# Patient Record
Sex: FEMALE | Race: BLACK OR AFRICAN AMERICAN | Employment: OTHER | URBAN - METROPOLITAN AREA
[De-identification: names, ages, dates, MRNs, and addresses within clinical notes are randomized per-mention and may not be internally consistent; named-entity substitution may affect disease eponyms.]

---

## 2019-05-14 ENCOUNTER — HOSPITAL ENCOUNTER (EMERGENCY)
Facility: HOSPITAL | Age: 69
Discharge: HOME | End: 2019-05-15
Attending: EMERGENCY MEDICINE
Payer: MEDICARE

## 2019-05-14 DIAGNOSIS — I10 UNCONTROLLED HYPERTENSION: Primary | ICD-10-CM

## 2019-05-14 PROCEDURE — 99283 EMERGENCY DEPT VISIT LOW MDM: CPT

## 2019-05-14 PROCEDURE — 93010 ELECTROCARDIOGRAM REPORT: CPT | Performed by: EMERGENCY MEDICINE

## 2019-05-14 PROCEDURE — 93005 ELECTROCARDIOGRAM TRACING: CPT

## 2019-05-14 RX ORDER — HYDRALAZINE HYDROCHLORIDE 10 MG/1
10 TABLET, FILM COATED ORAL ONCE
Status: COMPLETED | OUTPATIENT
Start: 2019-05-14 | End: 2019-05-14

## 2019-05-15 VITALS
DIASTOLIC BLOOD PRESSURE: 81 MMHG | HEIGHT: 64 IN | HEART RATE: 66 BPM | RESPIRATION RATE: 20 BRPM | TEMPERATURE: 98 F | SYSTOLIC BLOOD PRESSURE: 150 MMHG | OXYGEN SATURATION: 95 % | BODY MASS INDEX: 26.39 KG/M2 | WEIGHT: 154.56 LBS

## 2019-05-15 NOTE — ED NOTES
Pt reports she had a headache today and checked her BP several times at home. High was 178/105. Low was 142/95. HR 77-91. Current /80 with 7/10 headache. Pt states she took 2 tylenol of unknown strength at 2030 and has some relief from pain.

## 2019-05-15 NOTE — ED PROVIDER NOTES
Patient Seen in: [facility] AND [facility] Emergency Department    History   Patient presents with:  Hypertension (cardiovascular)      HPI    Patient presents to the ED concerned about elevated blood pressure from her baseline today.   She states she took her pr 1.00        Years: 30.00        Pack years: 27        Quit date: 2005        Years since quittin.3      Smokeless tobacco: Never Used    Substance and Sexual Activity      Alcohol use: No        Alcohol/week: 0.0 oz      Drug use: No      ROS  Per hydrALAzine HCl (APRESOLINE) tab 10 mg (10 mg Oral Given 5/14/19 2323)         MDM      05/14/19  2315 05/14/19  2330 05/14/19  2356 05/15/19  0000   BP: 142/78 140/82  150/81   Pulse: 68 71  66   Resp: 20 20  20   Temp:       TempSrc:       SpO2: 95% 96

## 2019-05-15 NOTE — ED INITIAL ASSESSMENT (HPI)
Pt sts having a headache today, so she took her BP which was 164/103. Pt has hx of HTN, so she checked her BP almost hourly. Pt has a hx of stroke, so she was concerned. Pt takes her BP meds. Pt also c/p weakness. Denies CP. + DIZZINESS.

## 2023-06-01 ENCOUNTER — OFFICE VISIT (OUTPATIENT)
Dept: AUDIOLOGY | Facility: CLINIC | Age: 73
End: 2023-06-01

## 2023-06-01 ENCOUNTER — OFFICE VISIT (OUTPATIENT)
Dept: OTOLARYNGOLOGY | Facility: CLINIC | Age: 73
End: 2023-06-01

## 2023-06-01 VITALS — HEIGHT: 64 IN | WEIGHT: 173 LBS | BODY MASS INDEX: 29.53 KG/M2

## 2023-06-01 DIAGNOSIS — R42 DIZZINESS: Primary | ICD-10-CM

## 2023-06-01 DIAGNOSIS — J34.3 NASAL TURBINATE HYPERTROPHY: ICD-10-CM

## 2023-06-01 PROCEDURE — 1159F MED LIST DOCD IN RCRD: CPT | Performed by: SPECIALIST

## 2023-06-01 PROCEDURE — 1160F RVW MEDS BY RX/DR IN RCRD: CPT | Performed by: SPECIALIST

## 2023-06-01 PROCEDURE — 92557 COMPREHENSIVE HEARING TEST: CPT | Performed by: AUDIOLOGIST

## 2023-06-01 PROCEDURE — 99203 OFFICE O/P NEW LOW 30 MIN: CPT | Performed by: SPECIALIST

## 2023-06-01 PROCEDURE — 3008F BODY MASS INDEX DOCD: CPT | Performed by: SPECIALIST

## 2023-06-01 PROCEDURE — 92567 TYMPANOMETRY: CPT | Performed by: AUDIOLOGIST

## 2023-06-02 NOTE — PATIENT INSTRUCTIONS
Your audiogram was within normal limits. Continue Flonase and Zyrtec for your nasal congestion. I ordered a vestibular nystagmogram to better evaluate your dizziness.

## 2023-06-12 ENCOUNTER — APPOINTMENT (OUTPATIENT)
Dept: GENERAL RADIOLOGY | Facility: HOSPITAL | Age: 73
End: 2023-06-12
Attending: STUDENT IN AN ORGANIZED HEALTH CARE EDUCATION/TRAINING PROGRAM
Payer: MEDICARE

## 2023-06-12 ENCOUNTER — HOSPITAL ENCOUNTER (EMERGENCY)
Facility: HOSPITAL | Age: 73
Discharge: HOME | End: 2023-06-13
Attending: STUDENT IN AN ORGANIZED HEALTH CARE EDUCATION/TRAINING PROGRAM
Payer: MEDICARE

## 2023-06-12 DIAGNOSIS — R42 EPISODIC LIGHTHEADEDNESS: Primary | ICD-10-CM

## 2023-06-12 LAB
ANION GAP SERPL CALC-SCNC: 6 MMOL/L (ref 0–18)
BASOPHILS # BLD AUTO: 0.04 X10(3) UL (ref 0–0.2)
BASOPHILS NFR BLD AUTO: 0.5 %
BUN BLD-MCNC: 24 MG/DL (ref 7–18)
BUN/CREAT SERPL: 16.7 (ref 10–20)
CALCIUM BLD-MCNC: 9.3 MG/DL (ref 8.5–10.1)
CHLORIDE SERPL-SCNC: 108 MMOL/L (ref 98–112)
CO2 SERPL-SCNC: 27 MMOL/L (ref 21–32)
CREAT BLD-MCNC: 1.44 MG/DL
D DIMER PPP FEU-MCNC: 0.4 UG/ML FEU (ref ?–0.7)
DEPRECATED RDW RBC AUTO: 45.9 FL (ref 35.1–46.3)
EOSINOPHIL # BLD AUTO: 0.09 X10(3) UL (ref 0–0.7)
EOSINOPHIL NFR BLD AUTO: 1.2 %
ERYTHROCYTE [DISTWIDTH] IN BLOOD BY AUTOMATED COUNT: 13.3 % (ref 11–15)
GFR SERPLBLD BASED ON 1.73 SQ M-ARVRAT: 39 ML/MIN/1.73M2 (ref 60–?)
GLUCOSE BLD-MCNC: 111 MG/DL (ref 70–99)
HCT VFR BLD AUTO: 46.3 %
HGB BLD-MCNC: 15.3 G/DL
IMM GRANULOCYTES # BLD AUTO: 0.01 X10(3) UL (ref 0–1)
IMM GRANULOCYTES NFR BLD: 0.1 %
LYMPHOCYTES # BLD AUTO: 2.4 X10(3) UL (ref 1–4)
LYMPHOCYTES NFR BLD AUTO: 32.4 %
MCH RBC QN AUTO: 31 PG (ref 26–34)
MCHC RBC AUTO-ENTMCNC: 33 G/DL (ref 31–37)
MCV RBC AUTO: 93.9 FL
MONOCYTES # BLD AUTO: 0.67 X10(3) UL (ref 0.1–1)
MONOCYTES NFR BLD AUTO: 9.1 %
NEUTROPHILS # BLD AUTO: 4.19 X10 (3) UL (ref 1.5–7.7)
NEUTROPHILS # BLD AUTO: 4.19 X10(3) UL (ref 1.5–7.7)
NEUTROPHILS NFR BLD AUTO: 56.7 %
OSMOLALITY SERPL CALC.SUM OF ELEC: 297 MOSM/KG (ref 275–295)
PLATELET # BLD AUTO: 177 10(3)UL (ref 150–450)
POTASSIUM SERPL-SCNC: 4.4 MMOL/L (ref 3.5–5.1)
RBC # BLD AUTO: 4.93 X10(6)UL
SODIUM SERPL-SCNC: 141 MMOL/L (ref 136–145)
TROPONIN I HIGH SENSITIVITY: 18 NG/L
WBC # BLD AUTO: 7.4 X10(3) UL (ref 4–11)

## 2023-06-12 PROCEDURE — 85379 FIBRIN DEGRADATION QUANT: CPT | Performed by: STUDENT IN AN ORGANIZED HEALTH CARE EDUCATION/TRAINING PROGRAM

## 2023-06-12 PROCEDURE — 93010 ELECTROCARDIOGRAM REPORT: CPT

## 2023-06-12 PROCEDURE — 93005 ELECTROCARDIOGRAM TRACING: CPT

## 2023-06-12 PROCEDURE — 85025 COMPLETE CBC W/AUTO DIFF WBC: CPT | Performed by: STUDENT IN AN ORGANIZED HEALTH CARE EDUCATION/TRAINING PROGRAM

## 2023-06-12 PROCEDURE — 84484 ASSAY OF TROPONIN QUANT: CPT | Performed by: STUDENT IN AN ORGANIZED HEALTH CARE EDUCATION/TRAINING PROGRAM

## 2023-06-12 PROCEDURE — 80048 BASIC METABOLIC PNL TOTAL CA: CPT | Performed by: STUDENT IN AN ORGANIZED HEALTH CARE EDUCATION/TRAINING PROGRAM

## 2023-06-12 PROCEDURE — 71045 X-RAY EXAM CHEST 1 VIEW: CPT | Performed by: STUDENT IN AN ORGANIZED HEALTH CARE EDUCATION/TRAINING PROGRAM

## 2023-06-12 PROCEDURE — 80048 BASIC METABOLIC PNL TOTAL CA: CPT

## 2023-06-12 PROCEDURE — 96360 HYDRATION IV INFUSION INIT: CPT

## 2023-06-12 PROCEDURE — 99285 EMERGENCY DEPT VISIT HI MDM: CPT

## 2023-06-12 PROCEDURE — 85025 COMPLETE CBC W/AUTO DIFF WBC: CPT

## 2023-06-12 PROCEDURE — 99284 EMERGENCY DEPT VISIT MOD MDM: CPT

## 2023-06-13 VITALS
BODY MASS INDEX: 29.53 KG/M2 | WEIGHT: 173 LBS | OXYGEN SATURATION: 94 % | HEIGHT: 64 IN | SYSTOLIC BLOOD PRESSURE: 138 MMHG | DIASTOLIC BLOOD PRESSURE: 78 MMHG | TEMPERATURE: 98 F | HEART RATE: 81 BPM | RESPIRATION RATE: 13 BRPM

## 2023-06-13 NOTE — ED INITIAL ASSESSMENT (HPI)
80 y/o female pmh of stroke here for eval of near syncope yesterday and today, episodes lasting 2 mins. She reports some generalized weakness at present. BEFAST Negative in triage. Denies CP/SOB.

## 2023-06-14 LAB
ATRIAL RATE: 88 BPM
P AXIS: 57 DEGREES
P-R INTERVAL: 144 MS
Q-T INTERVAL: 348 MS
QRS DURATION: 78 MS
QTC CALCULATION (BEZET): 421 MS
R AXIS: 14 DEGREES
T AXIS: 43 DEGREES
VENTRICULAR RATE: 88 BPM

## 2023-08-02 ENCOUNTER — OFFICE VISIT (OUTPATIENT)
Dept: AUDIOLOGY | Facility: CLINIC | Age: 73
End: 2023-08-02

## 2023-08-02 DIAGNOSIS — R42 DIZZINESS: Primary | ICD-10-CM

## 2023-08-02 PROCEDURE — 92537 CALORIC VSTBLR TEST W/REC: CPT | Performed by: AUDIOLOGIST

## 2023-08-02 PROCEDURE — 92540 BASIC VESTIBULAR EVALUATION: CPT | Performed by: AUDIOLOGIST

## 2023-08-03 ENCOUNTER — TELEPHONE (OUTPATIENT)
Dept: OTOLARYNGOLOGY | Facility: CLINIC | Age: 73
End: 2023-08-03

## 2023-08-03 DIAGNOSIS — H81.8X2 LEFT-SIDED VESTIBULAR WEAKNESS: Primary | ICD-10-CM

## 2023-08-03 NOTE — PROGRESS NOTES
Videonystagmography   (VNG)    Alfonzo Mcdonald is a 81year old female     Referring Provider: Ulises Comer   YOB: 1950  Medical Record: [id number]                           History:  Patient reported dizziness symptoms for \"awhile\", at least the past several years. Her dizziness is random and intermittent and does not appear to be related to head or body movement. She reported that she feels unsteady or like she's going to pass out. Sometimes she states that it feels as if the ground is moving. She noted a past history of sudden onset vertigo in approximately 2015 that woke her in the middle of the night and then took several weeks to completely resolve. She also noted a past history of stroke in 2017. All contraindicated medications were discontinued for 48 hours prior to VNG testing. Procedures completed today:    Spontaneous Nystagmus: No horizontal nystagmus is noted in primary position. Saccade Test: Normal peak velocities, accuracies and latencies for horizontal saccades. Gaze Nystagmus Test: No gaze nystagmus noted. Tracking Test: Normal horizontal tracking in both directions. Optokinetic Test: Symmetrical optokinetic testing in both directions at both intensities tested. Head Shaking Test: No nystagmus was present  post horizontal head shaking test.    Marilynn-Hallpike Maneuver: No evidence of torsional nystagmus during the David-Tacoma maneuver. Positional Testing:  Sitting:  No horizontal nystagmus  Supine:  No horizontal nystagmus  Head Right: No horizontal nystagmus  Head Left: No horizontal nystagmus    Bithermal Caloric Test:  Left cool caloric:  10  deg/sec  Right cool caloric:   28 deg/sec  Left warm caloric:  9  deg/sec  Right warm caloric:  31deg/sec     Significant unilateral weakness is present. ( 51% weakness in the left ear)  No significant directional preponderance  ( 5% to the right)  Normal fixation suppression. Interpretation:  Abnormal VNG study, with a finding of significant unilateral weakness on the left (51%). This is consistent with a left peripheral vestibular disorder. Ocular motor tests of central vestibular function were all within normal limits. No spontaneous, gaze, or positional nystagmus was recorded or observed. Hallpike was negative for BPPV. Recommendations: Follow-up with Samantha Winn M.D. for results. Quoc Melendez   Audiologist

## 2023-08-03 NOTE — TELEPHONE ENCOUNTER
Returned call to patient, she reports she has already made the appointment for therapy. Explained to he rthe vestibular system. Patient understanding.

## 2023-08-03 NOTE — TELEPHONE ENCOUNTER
You have 51% weakness in the left vestibular system. I recommend vestibular rehab. An order was placed for you in the system. Copy was also sent to your primary care physician.

## 2023-08-17 ENCOUNTER — TELEPHONE (OUTPATIENT)
Dept: PHYSICAL THERAPY | Facility: HOSPITAL | Age: 73
End: 2023-08-17

## 2023-08-18 ENCOUNTER — OFFICE VISIT (OUTPATIENT)
Dept: PHYSICAL THERAPY | Facility: HOSPITAL | Age: 73
End: 2023-08-18
Attending: SPECIALIST
Payer: MEDICARE

## 2023-08-18 DIAGNOSIS — H81.8X2 LEFT-SIDED VESTIBULAR WEAKNESS: Primary | ICD-10-CM

## 2023-08-18 PROCEDURE — 97112 NEUROMUSCULAR REEDUCATION: CPT

## 2023-08-18 PROCEDURE — 97162 PT EVAL MOD COMPLEX 30 MIN: CPT

## 2023-08-18 NOTE — PATIENT INSTRUCTIONS
Access Code: [email or web address]  URL: [email or web address]  Date: 08/18/2023  Prepared by: Lilo Escoto    Exercises  - Walking with Head Rotation  - 1 x daily - 7 x weekly  - Single Leg Balance  - 1 x daily - 3 x weekly  - Standing with Eyes Closed  - 1 x daily - 7 x weekly - 2 sets  - Tandem Walking  - 1 x daily - 7 x weekly - 20 reps

## 2023-08-18 NOTE — PROGRESS NOTES
VESTIBULAR EVALUATION:   Referring Physician: Dr. Juan Eaton  Diagnosis: Dizziness, gait instability     Date of Service: 8/18/2023   Insurance:  Umesh Eason is a 81year old female who presents to therapy today with reports of dizziness and imbalance. History/onset of current condition: Pt. Has history of ischemic cerebellar vermis stroke 2017, reports intermittent dizziness over past few months, no sudden onset. Pt. Had cardiac workup without any impairment, had 14 day cardiac monitor but does not have the results yet. Pt. Had VNG testing 8/2 which showed 51% caloric weakness on left. Pt. Had previous audiogram which showed mild HFSNHL, symmetrical.     Symptoms with cough/sneeze or loud noise: No  Falls: No  Hx of migraines: No  Hx of vision issue: No- wears corrective lenses  Hx of hearing issues: none    Dizziness: Current 0/10, Best 0/10, Worst 5/10  Quality: lightheadedness, some anxiety with the dizziness  Frequency/Duration:  brief when exacerbated, few minutes  Aggravates: Bending over, Quick head movements, Repetitive movements, Turning/direction changes, Looking/reaching up, and Driving, elevators  Relieves: Not moving and Sitting down    Current functional limitations include when dizzy, unable to walk and access community, difficulty looking at moving water when fishing, limited travel, difficulty with rapid turns, movement with household chores. Social history:  Lives with family, walks for exercise at the mall 4-5x/week, silver sneakers exercise class. Retired  at [employer]. Mickie describes prior level of function indep without limitations, exercising regularly. Pt goals include driving without dizziness, less dizziness with change in environment, able to go on cruise. Past medical history was reviewed with Mickie.  Significant findings include Carotid atherosclerosis, Carpal tunnel syndrome, DJD (degenerative joint disease) of knee, JOYNER (dyspnea on exertion), Essential hypertension, Myalgia and myositis, stroke (2017) and Vertigo (2015, had PT and it got better). ASSESSMENT  Angie Conway presents to physical therapy evaluation with primary c/o dizziness and imbalance. With her history of cerebellar stroke and left peripheral vestibular hypofunction, pt. Has mixed central and peripheral vestibular involvement, which would explain her symptoms. She demonstrates mild motion sensitivity and moderate postural control and gait impairment. A full oculomotor exam will be completed next session, but VNG did not show any central signs on oculomotor exam.  Functional deficits include but are not limited to impaired gait in community with dizziness episodes, difficulty looking at moving environments such as water when fishing, difficulty with rapid turns and movement during household chores, limited travel. PT discussed evaluation findings, pathology, plan of care and home exercise program with pt. Pt voiced understanding and performs home exercise program correctly. Skilled Physical Therapy is medically necessary to address the above impairments and reach functional goals. Precautions:  Fall Risk  OBJECTIVE:   Physical Exam:  Posture/Observation: Arrived without assistive device. Neuro Screen: Sensation: WNL for light touch screen, no reports of paresthesias. Coordination Testing:   Finger to Nose: WNL   Pronation/supination: slow speed and decreased accuracy   Toe tapping: WNL     Cervical spine ROM: limited rotation to 50 deg bilaterally  Adverse neuro signs with ROM: yes no: no     Oculomotor & Vestibular Exam:  TBA next session    Postural Control:   Romberg: EO 30 sec, EC 30 sec  inc sway  Tandem Stance: unable  SLS: R EO <5 sec, EC 0 sec; L EO <5 sec, EC 0 sec     Functional Mobility:   Gait: pt ambulates on level ground with decreased arm swing, stooped posture/forward lean, difficulty turning, and path deviation with visual scanning. Functional Gait Assessment   Item Description Score (0 worst, 3 best)    ___2____1. Gait Level Surface-  Time: _____6.5_____seconds  ____2___2. Change in gait speed  ____2___3. Gait with horizontal head turns   ____2___4. Gait with vertical head turns  ____3___5. Gait and pivot turn  ____2___6. Step over obstacle  ___0____7. Gait with narrow base of support-  # of steps________  ___1____8. Gait with eyes closed-  Time: ___12_______seconds  ___2____9. Ambulating backward  ___2____10. Steps    TOTAL SCORE: ____16___/30    (less than 22/30 = fall risk)     Five Times Sit to Stand Test: 18 sec = fall risk     Today's Treatment and Response:   Pt education was provided on exam findings, treatment diagnosis, treatment plan, expectations, and prognosis. Pt was also provided recommendations for activity modifications, importance of remaining active, strategies to reduce fall risk at home, and possible dizziness after evaluation. Patient was instructed in and issued a Home Exercise Program to address current impairments. Charges: PT Eval Moderate Complexity, NMR x 1      Total Timed Treatment: 50 min     Total Treatment Time: 50 min     Based on clinical rationale and outcome measures, this evaluation involved Moderate Complexity decision making due to 3+ personal factors/comorbidities, 3 body structures involved/activity limitations, and unstable symptoms including  dizziness and imbalance . PLAN OF CARE:    Goals: (to be met in 10 visits)  Pt. Improved in Functional Gait Assessment to at least 25/30 to improve fall risk and reflect an improvement in gait and balance. Pt. Able to turn head to visually scan her environment in standing and gait in order to improve tolerance of complex visual environments and visual motion. Pt. Able to ambulate in community environments independently without dizziness. Indep in home exercise program in order to maintain functional gains.     Frequency / Duration: Patient will be seen for 1-2 x/week or a total of 10 visits over a 90 day period. Treatment will include: home exercise program development and instruction, patient/family education, balance training, eye/head coordination exercises, sensory organization training, optokinetic stimulation , and gait training. Education or treatment limitation: None   Rehab Potential: good     Patient was advised of these findings, precautions, and treatment options and has agreed to actively participate in planning and for this course of care. Thank you for your referral. Please co-sign or sign and return this letter via fax as soon as possible to [phone]. If you have any questions, please contact me at [phone]. Sincerely,  Electronically signed by therapist: Tonio Vu PT  [de-identified] certification required: Yes  I certify the need for these services furnished under this plan of treatment and while under my care.     X___________________________________________________ Date____________________    Certification From: 7/88/0349  To:11/16/2023

## 2023-08-24 ENCOUNTER — OFFICE VISIT (OUTPATIENT)
Dept: PHYSICAL THERAPY | Facility: HOSPITAL | Age: 73
End: 2023-08-24
Attending: SPECIALIST
Payer: MEDICARE

## 2023-08-24 PROCEDURE — 97112 NEUROMUSCULAR REEDUCATION: CPT

## 2023-08-24 NOTE — PATIENT INSTRUCTIONS
Access Code: [email or web address]  URL: [email or web address]  Date: 08/24/2023  Prepared by: Naheed Cox    Exercises  - Walking with Head Rotation  - 1 x daily - 7 x weekly  - Single Leg Balance  - 1 x daily - 3 x weekly  - Standing with Eyes Closed  - 1 x daily - 7 x weekly - 2 sets  - Tandem Walking  - 1 x daily - 7 x weekly - 20 reps  - Tandem Stance  - 1 x daily - 7 x weekly - 2 sets - 30 hold  - Walking with vertical and horizontal VOR cancellation  - 1 x daily - 7 x weekly

## 2023-08-24 NOTE — PROGRESS NOTES
Date  8/24/23           Visit Number  2/10           NMR x           Ther EX            Ther Act            Gait Training            CRM             Manual              Dx: Dizziness, Gait instability         Insurance:  [facility] [facility]    Visit # authorized:  10 visits until 11/18  Referring MD: Drake Lundy  Precautions: fall risk  Medication Changes since last visit?: No  Subjective: Pt. Reports that she was feeling good, but in the past two days since it has been so hot she is feeling more unsteady and dizzy. Pt. Reports that she has been doing exercises with good tolerance. Objective:   Neuromuscular re-education:  40 min  Oculomotor & Vestibular Exam:  Spontaneous Nystagmus: room light: none ;  fixation blocked: none  Smooth Pursuit: Negative  Saccades: Negative  Gaze Evoked Nystagmus:  room light: Negative; fixation blocked: Negative  Head Thrust: Negative  VOR screen:  WNL no symptoms    VOR Cancellation: Negative   Convergence: Negative  Cover/Uncover:  Negative  Cross Cover:  Negative  Head Shaking Nystagmus: Negative  Dynamic Visual Acuity:  WNL- degraded 1 line    Positional Testing:   Marilynn-Hallpike: Negative, no symptoms bilaterally  Roll Test [facility]): Negative, no symptoms    VOR cx with standing 30 sec   VOR cx with gait 40 ft x 2  Semi tandem EO and EC x 30 sec  Romberg EC x 30 sec    Patient Education: Issued revised written HEP. See pt. Instructions section for details. Discussed rationale for exercises and recovery. Assessment: Pt. Tolerated well, no dizziness during session. Pt. Reports imbalance during functional tasks, kalyan turning, and attributes symptoms to imbalance more than dizziness. Pt. Has no evidence of BPPV. Her dynamic visual acuity was WNL according to Dynamic Visual Acuity test, which indicates that gaze stability is WNL and compensation from peripheral hypofunction has occurred.         Plan for next few sessions: Heidi Thompson, four square stepping    Charges: NMR x 3        Total Timed Treatment: 40 min  Total Treatment Time: 40 min

## 2023-08-29 ENCOUNTER — OFFICE VISIT (OUTPATIENT)
Dept: PHYSICAL THERAPY | Facility: HOSPITAL | Age: 73
End: 2023-08-29
Attending: SPECIALIST
Payer: MEDICARE

## 2023-08-29 PROCEDURE — 97112 NEUROMUSCULAR REEDUCATION: CPT

## 2023-09-05 ENCOUNTER — OFFICE VISIT (OUTPATIENT)
Dept: PHYSICAL THERAPY | Facility: HOSPITAL | Age: 73
End: 2023-09-05
Attending: SPECIALIST
Payer: MEDICARE

## 2023-09-05 PROCEDURE — 97112 NEUROMUSCULAR REEDUCATION: CPT

## 2023-09-05 NOTE — PROGRESS NOTES
Date  8/24/23 8/29/23 9/5/23         Visit Number  2/10 3/10 4/10         NMR x x x         Ther EX            Ther Act            Gait Training            CRM             Manual              Dx: Dizziness, Gait instability         Insurance:  [address] [facility]    Visit # authorized:  10 visits until 11/18  Referring MD: Dustin Wood  Precautions: fall risk  Medication Changes since last visit?: No  Subjective: Pt. Reports that she has had increased back pain over weekend, currently 9/10. She reports unable to do exercises over weekend due to back pain. She reports also having trouble with her breathing over weekend, some nasal congestion, attributes it to the weather (hot and humid). Objective:   Neuromuscular re-education:  40 min  NuStep x 10 min, level 4, seat 7, cues for speed  Semi tandem EC x 30 sec  SLS x 30 sec ea EO  Sidestepping over 12 in hurdles x 15 ea way  Rockerboard L/R and A/P, no UE support  Tap up on 2 cones x 10 ea LE no UE support      Patient Education: Issued revised written HEP. See pt. Instructions section for details. Discussed rationale for exercises and recovery. Assessment: Pt. Tolerated well, back pain down to 2/10 at end of session, much better after NuStep.    Pt. Improving in postural stability, no dizziness during session, required less rest.      Plan for next few sessions: F/B gait with turns, ball toss    Charges: NMR x 3        Total Timed Treatment: 40 min  Total Treatment Time: 40 min

## 2023-09-05 NOTE — PATIENT INSTRUCTIONS
Access Code: [email or web address]  URL: [email or web address]  Date: 08/29/2023  Prepared by: Justice Mota    Exercises  - Single Leg Balance  - 1 x daily - 3 x weekly  - Half Tandem Stance Balance with Eyes Closed  - 1 x daily - 7 x weekly - 30 hold  - Walking Gaze Stabilization Head Rotation  - 1 x daily - 3 x weekly  - Stepping in 4 Square Pattern  - 1 x daily - 7 x weekly - 5 reps  - Tandem walking forward and back  - 1 x daily - 7 x weekly - 20 reps

## 2023-09-12 ENCOUNTER — OFFICE VISIT (OUTPATIENT)
Dept: PHYSICAL THERAPY | Facility: HOSPITAL | Age: 73
End: 2023-09-12
Attending: SPECIALIST
Payer: MEDICARE

## 2023-09-12 PROCEDURE — 97116 GAIT TRAINING THERAPY: CPT

## 2023-09-12 PROCEDURE — 97112 NEUROMUSCULAR REEDUCATION: CPT

## 2023-10-17 ENCOUNTER — TELEPHONE (OUTPATIENT)
Dept: PHYSICAL THERAPY | Facility: HOSPITAL | Age: 73
End: 2023-10-17

## 2023-10-19 ENCOUNTER — APPOINTMENT (OUTPATIENT)
Dept: PHYSICAL THERAPY | Facility: HOSPITAL | Age: 73
End: 2023-10-19
Attending: SPECIALIST
Payer: MEDICARE

## 2023-10-24 ENCOUNTER — OFFICE VISIT (OUTPATIENT)
Dept: PHYSICAL THERAPY | Facility: HOSPITAL | Age: 73
End: 2023-10-24
Attending: SPECIALIST
Payer: MEDICARE

## 2023-10-24 PROCEDURE — 97116 GAIT TRAINING THERAPY: CPT

## 2023-10-24 PROCEDURE — 97112 NEUROMUSCULAR REEDUCATION: CPT

## 2023-10-24 NOTE — PATIENT INSTRUCTIONS
Access Code: [email or web address]  URL: [email or web address]  Date: 10/24/2023  Prepared by: Zondra Dakins    Exercises  - Single Leg Balance  - 1 x daily - 3 x weekly  - Half Tandem Stance Balance with Eyes Closed  - 1 x daily - 7 x weekly - 30 hold  - Walking Gaze Stabilization Head Rotation  - 1 x daily - 3 x weekly  - Walking with vertical and horizontal VOR cancellation  - 1 x daily - 7 x weekly

## 2023-10-24 NOTE — PROGRESS NOTES
Date  8/24/23 8/29/23 9/5/23 9/12/23 10/24/23       Visit Number  2/10 3/10 4/10 5/10 6/10       NMR x x x x x       Ther EX            Ther Act            Gait Training    x        CRM             Manual              Dx: Dizziness, Gait instability         Insurance:  [address] [facility]    Visit # authorized:  10 visits until 11/18  Referring MD: Shweta Montana  Precautions: fall risk  Medication Changes since last visit?: No  Subjective: Pt. Reports that she is feeling ok and is feeling a little more confident in her balance, generally feeling better. She reports some brief dizziness and lightheadedness at times, with quick head movement. She has been exercising and they are getting easier. Objective:   Neuromuscular re-education:  30 min  Bosu Ball: mini squats x 10  Lateral and A/P weightshift  Upper trunk rotation L/R x 10 ea    Semi tandem EC x 30 sec  SLS x 30 sec ea EO  Tap up on 3 cones - different heights x 10 ea LE no UE support, cognitive dual task- naming  VOR cx standing x 20 horiz  VOR cx gait 40 ft x 2- path deviation  VOR x 1 with standing and gait  Heel raises x 10 ea  Calf stretch standing x 1 min ea    Gait Training:  10 min  F/B gait with turns 40 ft x 4  Gait with horiz/vert head turns 2 x 40 ft      Patient Education: Issued revised written HEP. See pt. Instructions section for details. Discussed rationale for exercises and recovery. Assessment: Pt. Tolerated well. Pt. Much improved in stability with gait with turns and backward gait, with increased speed. Pt. Challenged with VOR cx and VOR x 1 with gait.         Plan for next few sessions:  step up and over with turns    Charges: NMR x 2, Gait x 1      Total Timed Treatment: 40 min  Total Treatment Time: 40 min

## 2023-11-02 ENCOUNTER — OFFICE VISIT (OUTPATIENT)
Dept: PHYSICAL THERAPY | Facility: HOSPITAL | Age: 73
End: 2023-11-02
Attending: SPECIALIST
Payer: MEDICARE

## 2023-11-02 PROCEDURE — 97116 GAIT TRAINING THERAPY: CPT

## 2023-11-02 PROCEDURE — 97112 NEUROMUSCULAR REEDUCATION: CPT

## 2023-11-02 NOTE — PROGRESS NOTES
Date  8/24/23 8/29/23 9/5/23 9/12/23 10/24/23 11/2/23      Visit Number  2/10 3/10 4/10 5/10 6/10 7/10      NMR x x x x x x      Ther EX            Ther Act            Gait Training    x  x      CRM             Manual              Dx: Dizziness, Gait instability         Insurance:  [address] [facility]    Visit # authorized:  10 visits until 11/18  Referring MD: Roxanna Berger  Precautions: fall risk  Medication Changes since last visit?: No  Subjective: Pt. Reports that she is feeling ok and is feeling a little more confident in her balance, generally feeling better. She has been exercising and they are getting easier. Pt. Reports that she had a little headache this morning, BP high- 167/77. Pt. Took regular HTN meds. Objective:   Neuromuscular re-education:  30 min  Vitals: /79, HR 67 bpm    Bosu Ball: mini squats x 10  Lateral and A/P weightshift  Upper trunk rotation L/R x 10 ea  Tap up on 3 cones - different heights x 10 ea LE no UE support, cognitive dual task- naming  Step up/over 6 in step with turns x 10 ea, alternate lead leg  Tandem EC x 20 sec ea- previously only semi-tandem  SLS x 30 sec ea EO  Heel raises x 10 ea  Calf stretch standing x 1 min ea    Gait Training:  10 min  F/B gait with turns 40 ft x 4  Braiding L/R x 20 ft ea  Sidestepping L/R with turns every 3 steps 40 ft x 2      Patient Education: Issued revised written HEP. See pt. Instructions section for details. Discussed rationale for exercises and recovery. Assessment: Pt. Tolerated well. Pt. Much improved in stability with gait with turns and backward gait, with increased speed.       Plan for next few sessions:  Eye/head coordination    Charges: NMR x 2, Gait x 1      Total Timed Treatment: 40 min  Total Treatment Time: 40 min

## 2023-11-07 ENCOUNTER — OFFICE VISIT (OUTPATIENT)
Dept: PHYSICAL THERAPY | Facility: HOSPITAL | Age: 73
End: 2023-11-07
Attending: SPECIALIST
Payer: MEDICARE

## 2023-11-07 PROCEDURE — 97116 GAIT TRAINING THERAPY: CPT

## 2023-11-07 PROCEDURE — 97112 NEUROMUSCULAR REEDUCATION: CPT

## 2023-11-07 NOTE — PATIENT INSTRUCTIONS
Access Code: [email or web address]  URL: [email or web address]  Date: 11/07/2023  Prepared by: Cameron Coleman    Exercises  - Single Leg Balance  - 1 x daily - 3 x weekly  - Half Tandem Stance Balance with Eyes Closed  - 1 x daily - 7 x weekly - 30 hold  - Walking Gaze Stabilization Head Rotation  - 1 x daily - 3 x weekly  - Walking with vertical and horizontal VOR cancellation  - 1 x daily - 7 x weekly  - Braided Sidestepping  - 1 x daily - 7 x weekly - 2 sets - 10 repsy

## 2023-11-07 NOTE — PROGRESS NOTES
Date  8/24/23 8/29/23 9/5/23 9/12/23 10/24/23 11/2/23 11/7/23     Visit Number  2/10 3/10 4/10 5/10 6/10 7/10 8/10     NMR x x x x x x x     Ther EX            Ther Act            Gait Training    x  x x     CRM             Manual              Dx: Dizziness, Gait instability         Insurance:  [address] [place] [address]    Visit # authorized:  10 visits until 11/18  Referring MD: uLz Davis  Precautions: fall risk  Medication Changes since last visit?: No  Subjective: Pt. Reports that she is feeling doing ok but tired, as she is helping with her sister's medical care. She has been exercising and they are getting easier. No headache today. Objective:   Neuromuscular re-education:  30 min  Bosu Ball: stepping onto/off without UE support x 10- alternating lead leg  Bosu Ball: mini squats x 10  Lateral and A/P weightshift  Step up with opposite knee lift x 10 with 8 in step  VOR x 1 with F/B gait x 1 min horiz/vert  SLS x 30 sec ea EO  Tap up on 3 cones - different heights x 10 ea LE no UE support, cognitive dual task- naming  Tandem EC x 20 sec ea- previously only semi-tandem    Gait Training:  10 min  F/B gait with turns 40 ft x 4  Braiding L/R x 20 ft ea    Patient Education: Issued revised written HEP. See pt. Instructions section for details. Discussed rationale for exercises and recovery. Assessment: Pt. Tolerated well. Pt. Much improved in stability with gait with turns and backward gait, with increased speed.       Plan for next few sessions:  Reassess next visit for EWELINA Garcia 48    Charges: NMR x 2, Gait x 1      Total Timed Treatment: 40 min  Total Treatment Time: 40 min

## 2023-11-14 ENCOUNTER — OFFICE VISIT (OUTPATIENT)
Dept: PHYSICAL THERAPY | Facility: HOSPITAL | Age: 73
End: 2023-11-14
Attending: SPECIALIST
Payer: MEDICARE

## 2023-11-14 PROCEDURE — 97116 GAIT TRAINING THERAPY: CPT

## 2023-11-14 PROCEDURE — 97112 NEUROMUSCULAR REEDUCATION: CPT

## 2023-11-14 NOTE — PATIENT INSTRUCTIONS
Access Code: [email or web address]  URL: [email or web address]  Date: 11/07/2023  Prepared by: Manolo Martines    Exercises  - Single Leg Balance  - 1 x daily - 3 x weekly  - Half Tandem Stance Balance with Eyes Closed  - 1 x daily - 7 x weekly - 30 hold  - Walking Gaze Stabilization Head Rotation  - 1 x daily - 3 x weekly  - Walking with vertical and horizontal VOR cancellation  - 1 x daily - 7 x weekly  - Braided Sidestepping  - 1 x daily - 7 x weekly - 2 sets - 10 repsy

## 2023-11-14 NOTE — PROGRESS NOTES
Patient Name: Amie Lobo, : 1950, MRN: [id number]   Date:  2023  Referring Physician:  Avtar Nelson    Diagnosis: Dizziness, gait instability    Discharge Summary    Pt has attended 9 visits in physical therapy. Progress Note Start Date: 23    End Date: 23    Subjective: Pt. Reports that she is much better since starting therapy. She is able to negotiate stairs, uneven surfaces in public without imbalance or dizziness. Pt. Reports that she is no longer dizzy during functional tasks. She has been exercising regularly and reports feeling stronger. Assessment: Pt. Has made excellent recovery, meeting all goals. She has improved in symptoms of imbalance and dizziness. She has also improved in all objective functional testing, and is no longer considered at risk for falls. No indication for further PT at this time, but pt. encouraged to contact MD and return for PT if indicated due to decline in function. Objective:   Neuromuscular re-education: 25 min  Romberg: EO 30 sec, EC 30 sec (was with increased sway)  Tandem Stance: 30 sec ea- (was unable)  SLS: R EO 30 sec bilat,  (was <5 sec bilateral)  Five Times Sit to Stand Test: 9.2 sec (was 18 sec = fall risk)   Reviewed HEP, encouraged pt. To continue with all exercises 2-3x/week as a maintenance program.      Gait Training: 15 min  Functional Gait Assessment   Item Description Score (0 worst, 3 best)    ___3____1. Gait Level Surface-  Time: _____4.8_____seconds  ____3___2. Change in gait speed  ____3___3. Gait with horizontal head turns   ____3___4. Gait with vertical head turns  ____3___5. Gait and pivot turn  ____3___6. Step over obstacle  ___3____7. Gait with narrow base of support-  # of steps__10______  ___3____8. Gait with eyes closed-  Time: ___5.8_______seconds  ___3____9. Ambulating backward  ___3____10. Steps    TOTAL SCORE: ____30___/30   (was 16/30)   (less than 22/30 = fall risk)          Goals    Pt. Improved in Functional Gait Assessment to at least 25/30 to improve fall risk and reflect an improvement in gait and balance. (GOAL MET)  Pt. Able to turn head to visually scan her environment in standing and gait in order to improve tolerance of complex visual environments and visual motion. (GOAL MET)  Pt. Able to ambulate in community environments independently without dizziness. (GOAL MET)  Indep in home exercise program in order to maintain functional gains. (GOAL MET)         Charges: NMR x 2, Gait x 1       Total Timed Treatment: 40 min  Total Treatment Time: 40 min    Plan: Discharge from PT with pt. To continue with home program as prescribed in order to maintain functional gains. Thank you for your referral. If you have any questions, please contact me at [phone].     Sincerely,  Enma Moreno PT, CESAR    Electronically signed by therapist:  Enma Moreno PT, NCS

## 2023-11-21 ENCOUNTER — APPOINTMENT (OUTPATIENT)
Dept: PHYSICAL THERAPY | Facility: HOSPITAL | Age: 73
End: 2023-11-21
Attending: SPECIALIST
Payer: MEDICARE

## 2023-11-28 ENCOUNTER — APPOINTMENT (OUTPATIENT)
Dept: PHYSICAL THERAPY | Facility: HOSPITAL | Age: 73
End: 2023-11-28
Attending: SPECIALIST
Payer: MEDICARE

## 2023-12-05 ENCOUNTER — APPOINTMENT (OUTPATIENT)
Dept: PHYSICAL THERAPY | Facility: HOSPITAL | Age: 73
End: 2023-12-05
Attending: SPECIALIST
Payer: MEDICARE

## 2023-12-12 ENCOUNTER — APPOINTMENT (OUTPATIENT)
Dept: PHYSICAL THERAPY | Facility: HOSPITAL | Age: 73
End: 2023-12-12
Attending: SPECIALIST
Payer: MEDICARE

## 2023-12-19 ENCOUNTER — APPOINTMENT (OUTPATIENT)
Dept: PHYSICAL THERAPY | Facility: HOSPITAL | Age: 73
End: 2023-12-19
Attending: SPECIALIST
Payer: MEDICARE

## 2023-12-26 ENCOUNTER — APPOINTMENT (OUTPATIENT)
Dept: PHYSICAL THERAPY | Facility: HOSPITAL | Age: 73
End: 2023-12-26
Attending: SPECIALIST
Payer: MEDICARE

## 2024-01-02 ENCOUNTER — APPOINTMENT (OUTPATIENT)
Dept: PHYSICAL THERAPY | Facility: HOSPITAL | Age: 74
End: 2024-01-02
Attending: SPECIALIST
Payer: MEDICARE

## 2025-02-05 ENCOUNTER — OFFICE VISIT (OUTPATIENT)
Dept: INTERNAL MEDICINE CLINIC | Facility: CLINIC | Age: 75
End: 2025-02-05

## 2025-02-05 VITALS
SYSTOLIC BLOOD PRESSURE: 136 MMHG | OXYGEN SATURATION: 97 % | HEIGHT: 64 IN | DIASTOLIC BLOOD PRESSURE: 82 MMHG | WEIGHT: 178 LBS | HEART RATE: 81 BPM | BODY MASS INDEX: 30.39 KG/M2

## 2025-02-05 DIAGNOSIS — J43.1 PANLOBULAR EMPHYSEMA (HCC): ICD-10-CM

## 2025-02-05 DIAGNOSIS — Z90.89 S/P PARATHYROIDECTOMY: ICD-10-CM

## 2025-02-05 DIAGNOSIS — N18.30 STAGE 3 CHRONIC KIDNEY DISEASE, UNSPECIFIED WHETHER STAGE 3A OR 3B CKD (HCC): ICD-10-CM

## 2025-02-05 DIAGNOSIS — D49.0 IPMN (INTRADUCTAL PAPILLARY MUCINOUS NEOPLASM): ICD-10-CM

## 2025-02-05 DIAGNOSIS — Z98.890 S/P PARATHYROIDECTOMY: ICD-10-CM

## 2025-02-05 DIAGNOSIS — Z86.73 HISTORY OF CVA (CEREBROVASCULAR ACCIDENT): ICD-10-CM

## 2025-02-05 DIAGNOSIS — N83.201 BILATERAL OVARIAN CYSTS: ICD-10-CM

## 2025-02-05 DIAGNOSIS — N83.202 BILATERAL OVARIAN CYSTS: ICD-10-CM

## 2025-02-05 DIAGNOSIS — I10 PRIMARY HYPERTENSION: ICD-10-CM

## 2025-02-05 DIAGNOSIS — Z12.31 ENCOUNTER FOR SCREENING MAMMOGRAM FOR BREAST CANCER: Primary | ICD-10-CM

## 2025-02-05 DIAGNOSIS — M81.0 AGE-RELATED OSTEOPOROSIS WITHOUT CURRENT PATHOLOGICAL FRACTURE: ICD-10-CM

## 2025-02-05 PROCEDURE — 1159F MED LIST DOCD IN RCRD: CPT | Performed by: INTERNAL MEDICINE

## 2025-02-05 PROCEDURE — 3075F SYST BP GE 130 - 139MM HG: CPT | Performed by: INTERNAL MEDICINE

## 2025-02-05 PROCEDURE — 1126F AMNT PAIN NOTED NONE PRSNT: CPT | Performed by: INTERNAL MEDICINE

## 2025-02-05 PROCEDURE — 1160F RVW MEDS BY RX/DR IN RCRD: CPT | Performed by: INTERNAL MEDICINE

## 2025-02-05 PROCEDURE — 99204 OFFICE O/P NEW MOD 45 MIN: CPT | Performed by: INTERNAL MEDICINE

## 2025-02-05 PROCEDURE — 3008F BODY MASS INDEX DOCD: CPT | Performed by: INTERNAL MEDICINE

## 2025-02-05 PROCEDURE — 3079F DIAST BP 80-89 MM HG: CPT | Performed by: INTERNAL MEDICINE

## 2025-02-05 RX ORDER — AMLODIPINE AND BENAZEPRIL HYDROCHLORIDE 5; 20 MG/1; MG/1
1 CAPSULE ORAL DAILY
COMMUNITY
Start: 2024-04-26

## 2025-02-05 RX ORDER — FLUTICASONE FUROATE AND VILANTEROL 200; 25 UG/1; UG/1
1 POWDER RESPIRATORY (INHALATION) DAILY
COMMUNITY
Start: 2024-02-13

## 2025-02-05 RX ORDER — CLOPIDOGREL BISULFATE 75 MG/1
75 TABLET ORAL DAILY
COMMUNITY
Start: 2023-07-10

## 2025-02-05 NOTE — PATIENT INSTRUCTIONS
Please schedule the following tests:  - MRI of pancreas ASAP  - Mammogram after 3/18/2025   - Bone scan after 11/16/2025  - US pelvis in 12/2025     All of these were ordered for you today.     Please follow up with me in the next 3-6 months for your annual physical.     Please have fasting labs done 1 week before your physical.

## 2025-02-05 NOTE — PROGRESS NOTES
Mickie Price is a 74 year old female with complaints of:  Chief Complaint: Establish Care    HPI     Mickie Price is a(n) 74 year old female with a history of hypertension, hyperlipidemia, COPD/emphysema, PAD, osteoporosis, IPMN's, ovarian cysts, who presents to establish care.    Patient is a history of hypertension.  Patient continues on amlodipine-benazepril 5-20 mg daily.    Patient with a history of hypercholesterolemia.  Patient continues off of cholesterol medications.     Patient has a history of COPD/emphysema.  Patient continues on Breo.    Patient has a history of osteoporosis.  Status post parotidectomy in .  Last DEXA done in , shows improved bone mineral density, now in osteopenic range.  Plan to repeat bone mineral density in .    Patient history of IPMNs.  Noted on MRI from , 1.6 Demeter nodule in the tail the pancreas, likely sidebranch IPMN. Needs repeat MRI in 2025.     Patient has a history of ovarian cyst.  Patient has had a normal .  Noted on pelvic ultrasound from 2023, stable right ovarian cyst.  Had repeat ultrasound in 2024.  Noted to have multiple bilateral ovarian cysts, slightly creased in size compared to prior, but no suspicious features.  Continued follow-up on an annual basis is recommended.    Patient is a history of CVA in . Continues on Plavix.     Patient history of CKD 3.  Baseline creatinine is around 1.2-1.3.    Past Medical History     Past Medical History:    Carotid atherosclerosis    Carpal tunnel syndrome    DJD (degenerative joint disease) of knee    JOYNER (dyspnea on exertion)    Essential hypertension    Myalgia and myositis    unspecified    Vertigo        Past Surgical History     Past Surgical History:   Procedure Laterality Date    Colonoscopy  2015    Js, told to come back 5 years    Hysterectomy      , for fibroids          3, 1         Family History     Family History   Problem Relation Age of  Onset    Cancer Father         cancer lung    Diabetes Mother     Hypertension Mother     Heart Disorder Mother 66        heart attack    Diabetes Maternal Grandmother     Diabetes Sister         two sisters    Hypertension Sister         4 sisters    Cancer Sister         breast    Hypertension Brother     Cancer Brother         colon, liver, lungs, and blood    Diabetes Son     Diabetes Maternal Aunt     Diabetes Maternal Uncle        Social History     Social History     Socioeconomic History    Marital status:    Tobacco Use    Smoking status: Former     Current packs/day: 0.00     Average packs/day: 1 pack/day for 30.0 years (30.0 ttl pk-yrs)     Types: Cigarettes     Start date: 1975     Quit date: 2005     Years since quittin.1    Smokeless tobacco: Never   Vaping Use    Vaping status: Never Used   Substance and Sexual Activity    Alcohol use: No     Alcohol/week: 0.0 standard drinks of alcohol    Drug use: No     Social Drivers of Health     Food Insecurity: No Food Insecurity (2024)    Received from [facility]    Hunger Vital Sign     Worried About Running Out of Food in the Last Year: Never true     Ran Out of Food in the Last Year: Never true   Transportation Needs: No Transportation Needs (2024)    Received from [facility]    PRAPARE - Transportation     Lack of Transportation (Medical): No     Lack of Transportation (Non-Medical): No   Stress: No Stress Concern Present (3/9/2023)    Received from [facility], [facility]    [employer] - Occupational Stress Questionnaire     Feeling of Stress : Not at all   Housing Stability: Low Risk  (2024)    Received from [facility]    Housing Stability Vital Sign     Unable to Pay for Housing in the Last Year: No     Number of Places Lived in the Last Year: 1     Unstable Housing in the Last  Year: No       Allergies     Allergies[1]    Current Medications     Current Outpatient Medications   Medication Sig Dispense Refill    amLODIPine Besy-Benazepril HCl 5-20 MG Oral Cap Take 1 capsule by mouth daily.      clopidogrel 75 MG Oral Tab Take 1 tablet (75 mg total) by mouth daily.      fluticasone furoate-vilanterol 200-25 MCG/ACT Inhalation Aerosol Powder, Breath Activated Inhale 1 puff into the lungs daily.      Albuterol Sulfate HFA (PROAIR HFA) 108 (90 BASE) MCG/ACT Inhalation Aero Soln Inhale 2 puffs into the lungs every 4 (four) hours as needed for Wheezing. 1 Inhaler 2    cetirizine (ZYRTEC) 10 MG Oral Tab TAKE ONE TABLET BY MOUTH NIGHTLY 90 tablet 0    Cholecalciferol (VITAMIN D) 1000 UNITS Oral Tab Take by mouth.      Omega-3 Fatty Acids (FISH OIL) 1200 MG Oral Cap Take by mouth.      Ascorbic Acid (VITAMIN C) 1000 MG Oral Tab Take 1 tablet (1,000 mg total) by mouth daily.      Albuterol Sulfate HFA (VENTOLIN) 108 (90 BASE) MCG/ACT Inhalation Aero Soln Inhale 2 puffs into the lungs every 6 (six) hours as needed for Wheezing. (Patient not taking: Reported on 2/5/2025) 1 Inhaler 2    aspirin 81 MG Oral Tab Take 81 mg by mouth daily.      Blood Glucose Monitoring Suppl SUPPLIES Does not apply Misc by Does not apply route.      Elastic Bandages & Supports (ELBOW SUPPORT/PRESSURE PADS) Does not apply Misc by Does not apply route.      AmLODIPine Besylate (NORVASC) 10 MG Oral Tab Take 1 tablet (10 mg total) by mouth daily. 90 tablet 0    Fluticasone Propionate (FLONASE) 50 MCG/ACT Nasal Suspension 2 sprays by Each Nare route nightly. 1 Bottle 2     No current facility-administered medications for this visit.       Review of Systems     GENERAL HEALTH: feels well otherwise  SKIN: denies any unusual skin lesions or rashes  RESPIRATORY: denies shortness of breath with exertion  CARDIOVASCULAR: denies chest pain on exertion  GI: denies abdominal pain and denies heartburn  : denies any burning with  urination, urinary frequency or urgency  NEURO: denies headaches, numbness or tingling, mental status changes  PSYCH: denies depressed mood, anxiety  MUSC: denies muscle aches, joint pain    Physical Exam     /82   Pulse 81   Ht 5' 4\" (1.626 m)   Wt 178 lb (80.7 kg)   SpO2 97%   BMI 30.55 kg/m²     GENERAL: well developed, well nourished,in no apparent distress  SKIN: no rashes,no suspicious lesions  HEENT: atraumatic, normocephalic,ears and throat are clear  NECK: supple,no adenopathy,no bruits  LUNGS: clear to auscultation  CARDIO: RRR without murmur  GI: good BS's,no masses, HSM or tenderness  EXTREMITIES: no cyanosis, clubbing or edema    Assessment and Plan     Diagnoses and all orders for this visit:    Encounter for screening mammogram for breast cancer. Ordered today.    -     [facility] VI 2D+3D SCREENING BILAT (CPT=77067/79781); Future    S/P parathyroidectomy. Age-related osteoporosis without current pathological fracture. Plan to recheck bone scan in November 2025  -     XR DEXA BONE DENSITOMETRY (CPT=77080); Future  -     CBC With Differential With Platelet; Future  -     Comp Metabolic Panel (14); Future  -     Lipid Panel; Future  -     TSH W Reflex To Free T4; Future  -     Vitamin D; Future  -     -II [E]; Future    History of CVA (cerebrovascular accident). NO deficits on exam. Cont Plavix.   -     CBC With Differential With Platelet; Future  -     Comp Metabolic Panel (14); Future  -     Lipid Panel; Future  -     TSH W Reflex To Free T4; Future  -     Vitamin D; Future  -     -II [E]; Future    Stage 3 chronic kidney disease, unspecified whether stage 3a or 3b CKD (HCC).  Avoid nephrotoxic medications.  Plan to recheck kidney function with next physical.  -     CBC With Differential With Platelet; Future  -     Comp Metabolic Panel (14); Future  -     Lipid Panel; Future  -     TSH W Reflex To Free T4; Future  -     Vitamin D; Future  -     -II [E]; Future    IPMN  (intraductal papillary mucinous neoplasm).  Needs repeat MRI of the abdomen.  -     Cancel: MRI ABDOMEN (W+WO) (CPT=74183); Future  -     MRI ABDOMEN (W+WO) (CPT=74183); Future  -     CBC With Differential With Platelet; Future  -     Comp Metabolic Panel (14); Future  -     Lipid Panel; Future  -     TSH W Reflex To Free T4; Future  -     Vitamin D; Future  -     -II [E]; Future    Bilateral ovarian cysts.  Check .  Repeat ultrasound in December 2025.  -     US PELVIS (TRANSABDOMINAL AND TRANSVAGINAL) (CPT=76856/03739); Future  -     CBC With Differential With Platelet; Future  -     Comp Metabolic Panel (14); Future  -     Lipid Panel; Future  -     TSH W Reflex To Free T4; Future  -     Vitamin D; Future  -     -II [E]; Future    Panlobular emphysema (HCC).  Continue Breo.  -     CBC With Differential With Platelet; Future  -     Comp Metabolic Panel (14); Future  -     Lipid Panel; Future  -     TSH W Reflex To Free T4; Future  -     Vitamin D; Future  -     -II [E]; Future    Primary hypertension. Well controlled today. Check labs to ensure stability of electrolytes and kidney function. Cont meds as per HPI.  -     CBC With Differential With Platelet; Future  -     Comp Metabolic Panel (14); Future  -     Lipid Panel; Future  -     TSH W Reflex To Free T4; Future  -     Vitamin D; Future  -     -II [E]; Future          amLODIPine Besy-Benazepril HCl 5-20 MG Oral Cap Take 1 capsule by mouth daily.      clopidogrel 75 MG Oral Tab Take 1 tablet (75 mg total) by mouth daily.      fluticasone furoate-vilanterol 200-25 MCG/ACT Inhalation Aerosol Powder, Breath Activated Inhale 1 puff into the lungs daily.      Albuterol Sulfate HFA (PROAIR HFA) 108 (90 BASE) MCG/ACT Inhalation Aero Soln Inhale 2 puffs into the lungs every 4 (four) hours as needed for Wheezing. 1 Inhaler 2    cetirizine (ZYRTEC) 10 MG Oral Tab TAKE ONE TABLET BY MOUTH NIGHTLY 90 tablet 0    Cholecalciferol (VITAMIN D) 1000  UNITS Oral Tab Take by mouth.      Omega-3 Fatty Acids (FISH OIL) 1200 MG Oral Cap Take by mouth.      Ascorbic Acid (VITAMIN C) 1000 MG Oral Tab Take 1 tablet (1,000 mg total) by mouth daily.         Requested Prescriptions      No prescriptions requested or ordered in this encounter       Orders Placed This Encounter   Procedures    CBC With Differential With Platelet     Standing Status:   Future     Standing Expiration Date:   2/5/2026     Order Specific Question:   Release to patient     Answer:   Immediate    Comp Metabolic Panel (14)     Standing Status:   Future     Standing Expiration Date:   2/5/2026    Lipid Panel     Standing Status:   Future     Standing Expiration Date:   2/5/2026     Order Specific Question:   Release to patient     Answer:   Immediate    TSH W Reflex To Free T4     Standing Status:   Future     Standing Expiration Date:   2/5/2026     Order Specific Question:   Release to patient     Answer:   Immediate    Vitamin D     Standing Status:   Future     Standing Expiration Date:   2/5/2026     Order Specific Question:   Please pick the scenario that best fits the purpose for ordering this test     Answer:   General Screening/Vit D deficiency (25-Hydroxy)     Order Specific Question:   Release to patient     Answer:   Immediate    -II [E]     Standing Status:   Future     Standing Expiration Date:   2/5/2026     Order Specific Question:   Release to patient     Answer:   Immediate       No follow-ups on file.    The patient indicates understanding of these issues and agrees to the plan.    Electronically signed by Sara Finch MD 02/05/25             [1]   Allergies  Allergen Reactions    Atorvastatin OTHER (SEE COMMENTS)     intolerance    Azelastine OTHER (SEE COMMENTS)     hypersensitivity    Clindamycin ANAPHYLAXIS    Pcn [Penicillin V] ANAPHYLAXIS    Pravastatin FATIGUE

## 2025-02-08 ENCOUNTER — LAB ENCOUNTER (OUTPATIENT)
Dept: LAB | Age: 75
End: 2025-02-08
Attending: INTERNAL MEDICINE
Payer: MEDICARE

## 2025-02-08 DIAGNOSIS — Z86.73 HISTORY OF CVA (CEREBROVASCULAR ACCIDENT): ICD-10-CM

## 2025-02-08 DIAGNOSIS — M81.0 AGE-RELATED OSTEOPOROSIS WITHOUT CURRENT PATHOLOGICAL FRACTURE: ICD-10-CM

## 2025-02-08 DIAGNOSIS — I10 PRIMARY HYPERTENSION: ICD-10-CM

## 2025-02-08 DIAGNOSIS — N83.201 BILATERAL OVARIAN CYSTS: ICD-10-CM

## 2025-02-08 DIAGNOSIS — D49.0 IPMN (INTRADUCTAL PAPILLARY MUCINOUS NEOPLASM): ICD-10-CM

## 2025-02-08 DIAGNOSIS — N18.30 STAGE 3 CHRONIC KIDNEY DISEASE, UNSPECIFIED WHETHER STAGE 3A OR 3B CKD (HCC): ICD-10-CM

## 2025-02-08 DIAGNOSIS — N83.202 BILATERAL OVARIAN CYSTS: ICD-10-CM

## 2025-02-08 DIAGNOSIS — J43.1 PANLOBULAR EMPHYSEMA (HCC): ICD-10-CM

## 2025-02-08 LAB
ALBUMIN SERPL-MCNC: 4.4 G/DL (ref 3.2–4.8)
ALBUMIN/GLOB SERPL: 1.5 {RATIO} (ref 1–2)
ALP LIVER SERPL-CCNC: 64 U/L
ALT SERPL-CCNC: 13 U/L
ANION GAP SERPL CALC-SCNC: 7 MMOL/L (ref 0–18)
AST SERPL-CCNC: 16 U/L (ref ?–34)
BASOPHILS # BLD AUTO: 0.04 X10(3) UL (ref 0–0.2)
BASOPHILS NFR BLD AUTO: 0.7 %
BILIRUB SERPL-MCNC: 0.9 MG/DL (ref 0.2–1.1)
BUN BLD-MCNC: 16 MG/DL (ref 9–23)
BUN/CREAT SERPL: 13.2 (ref 10–20)
CALCIUM BLD-MCNC: 9.5 MG/DL (ref 8.7–10.4)
CANCER AG125 SERPL-ACNC: 3 U/ML (ref ?–30.2)
CHLORIDE SERPL-SCNC: 101 MMOL/L (ref 98–112)
CHOLEST SERPL-MCNC: 200 MG/DL (ref ?–200)
CO2 SERPL-SCNC: 30 MMOL/L (ref 21–32)
CREAT BLD-MCNC: 1.21 MG/DL
DEPRECATED RDW RBC AUTO: 45.9 FL (ref 35.1–46.3)
EGFRCR SERPLBLD CKD-EPI 2021: 48 ML/MIN/1.73M2 (ref 60–?)
EOSINOPHIL # BLD AUTO: 0.13 X10(3) UL (ref 0–0.7)
EOSINOPHIL NFR BLD AUTO: 2.2 %
ERYTHROCYTE [DISTWIDTH] IN BLOOD BY AUTOMATED COUNT: 13.2 % (ref 11–15)
FASTING PATIENT LIPID ANSWER: YES
FASTING STATUS PATIENT QL REPORTED: YES
GLOBULIN PLAS-MCNC: 2.9 G/DL (ref 2–3.5)
GLUCOSE BLD-MCNC: 91 MG/DL (ref 70–99)
HCT VFR BLD AUTO: 46.9 %
HDLC SERPL-MCNC: 56 MG/DL (ref 40–59)
HGB BLD-MCNC: 15.9 G/DL
IMM GRANULOCYTES # BLD AUTO: 0.02 X10(3) UL (ref 0–1)
IMM GRANULOCYTES NFR BLD: 0.3 %
LDLC SERPL CALC-MCNC: 130 MG/DL (ref ?–100)
LYMPHOCYTES # BLD AUTO: 2.01 X10(3) UL (ref 1–4)
LYMPHOCYTES NFR BLD AUTO: 34.1 %
MCH RBC QN AUTO: 31.9 PG (ref 26–34)
MCHC RBC AUTO-ENTMCNC: 33.9 G/DL (ref 31–37)
MCV RBC AUTO: 94.2 FL
MONOCYTES # BLD AUTO: 0.6 X10(3) UL (ref 0.1–1)
MONOCYTES NFR BLD AUTO: 10.2 %
NEUTROPHILS # BLD AUTO: 3.1 X10 (3) UL (ref 1.5–7.7)
NEUTROPHILS # BLD AUTO: 3.1 X10(3) UL (ref 1.5–7.7)
NEUTROPHILS NFR BLD AUTO: 52.5 %
NONHDLC SERPL-MCNC: 144 MG/DL (ref ?–130)
OSMOLALITY SERPL CALC.SUM OF ELEC: 287 MOSM/KG (ref 275–295)
PLATELET # BLD AUTO: 160 10(3)UL (ref 150–450)
POTASSIUM SERPL-SCNC: 4.4 MMOL/L (ref 3.5–5.1)
PROT SERPL-MCNC: 7.3 G/DL (ref 5.7–8.2)
RBC # BLD AUTO: 4.98 X10(6)UL
SODIUM SERPL-SCNC: 138 MMOL/L (ref 136–145)
TRIGL SERPL-MCNC: 77 MG/DL (ref 30–149)
TSI SER-ACNC: 2.36 UIU/ML (ref 0.55–4.78)
VIT D+METAB SERPL-MCNC: 76.8 NG/ML (ref 30–100)
VLDLC SERPL CALC-MCNC: 14 MG/DL (ref 0–30)
WBC # BLD AUTO: 5.9 X10(3) UL (ref 4–11)

## 2025-02-08 PROCEDURE — 86304 IMMUNOASSAY TUMOR CA 125: CPT

## 2025-02-08 PROCEDURE — 82306 VITAMIN D 25 HYDROXY: CPT

## 2025-02-08 PROCEDURE — 80061 LIPID PANEL: CPT

## 2025-02-08 PROCEDURE — 85025 COMPLETE CBC W/AUTO DIFF WBC: CPT

## 2025-02-08 PROCEDURE — 36415 COLL VENOUS BLD VENIPUNCTURE: CPT

## 2025-02-08 PROCEDURE — 84443 ASSAY THYROID STIM HORMONE: CPT

## 2025-02-08 PROCEDURE — 80053 COMPREHEN METABOLIC PANEL: CPT

## 2025-02-10 RX ORDER — EZETIMIBE 10 MG/1
10 TABLET ORAL DAILY
Qty: 90 TABLET | Refills: 3 | Status: SHIPPED | OUTPATIENT
Start: 2025-02-10 | End: 2026-02-05

## 2025-02-12 ENCOUNTER — OFFICE VISIT (OUTPATIENT)
Dept: INTERNAL MEDICINE CLINIC | Facility: CLINIC | Age: 75
End: 2025-02-12
Payer: MEDICARE

## 2025-02-12 VITALS
BODY MASS INDEX: 30.08 KG/M2 | OXYGEN SATURATION: 96 % | HEIGHT: 64 IN | WEIGHT: 176.19 LBS | DIASTOLIC BLOOD PRESSURE: 73 MMHG | HEART RATE: 90 BPM | SYSTOLIC BLOOD PRESSURE: 121 MMHG

## 2025-02-12 DIAGNOSIS — I10 PRIMARY HYPERTENSION: Primary | ICD-10-CM

## 2025-02-12 PROCEDURE — 3078F DIAST BP <80 MM HG: CPT | Performed by: INTERNAL MEDICINE

## 2025-02-12 PROCEDURE — 1160F RVW MEDS BY RX/DR IN RCRD: CPT | Performed by: INTERNAL MEDICINE

## 2025-02-12 PROCEDURE — 99214 OFFICE O/P EST MOD 30 MIN: CPT | Performed by: INTERNAL MEDICINE

## 2025-02-12 PROCEDURE — 3008F BODY MASS INDEX DOCD: CPT | Performed by: INTERNAL MEDICINE

## 2025-02-12 PROCEDURE — 1125F AMNT PAIN NOTED PAIN PRSNT: CPT | Performed by: INTERNAL MEDICINE

## 2025-02-12 PROCEDURE — 3074F SYST BP LT 130 MM HG: CPT | Performed by: INTERNAL MEDICINE

## 2025-02-12 PROCEDURE — 1159F MED LIST DOCD IN RCRD: CPT | Performed by: INTERNAL MEDICINE

## 2025-02-12 RX ORDER — AMLODIPINE BESYLATE 5 MG/1
5 TABLET ORAL DAILY
Qty: 90 TABLET | Refills: 3 | Status: SHIPPED | OUTPATIENT
Start: 2025-02-12 | End: 2026-02-07

## 2025-02-12 RX ORDER — LORAZEPAM 1 MG/1
1 TABLET ORAL 2 TIMES DAILY PRN
COMMUNITY

## 2025-02-12 NOTE — PROGRESS NOTES
Mickie Price is a 74 year old female with complaints of:  Chief Complaint: Blood Pressure (Has been getting high blood pressure readings at home)    HPI     Mickie Price is a(n) 74 year old female with a history of  hypertension, hyperlipidemia, COPD/emphysema, PAD, osteoporosis, IPMN's, ovarian cysts, who presents for high BP at home.    BP at home was 189/105, 183/109, 157/97, 168/90, 150/98, 154/104, 161/99, 166/107, 145/86. Had a headache when her blood pressure was in the 180s.     Past Medical History     Past Medical History:    Acute, but ill-defined, cerebrovascular disease    Allergic rhinitis    Anxiety    Arthritis    Don't know    Carotid atherosclerosis    Carpal tunnel syndrome    DJD (degenerative joint disease) of knee    JOYNER (dyspnea on exertion)    Essential hypertension    Myalgia and myositis    unspecified    Vertigo        Past Surgical History     Past Surgical History:   Procedure Laterality Date    Colonoscopy  2015    Js, told to come back 5 years    Hysterectomy      , for fibroids          3, 1     Other surgical history      Parathyroid    Tubal ligation          Family History     Family History   Problem Relation Age of Onset    Cancer Father         cancer lung    Diabetes Mother     Hypertension Mother     Heart Disorder Mother 66        heart attack    Diabetes Maternal Grandmother     Diabetes Sister         two sisters    Hypertension Sister         4 sisters    Cancer Sister         breast    Hypertension Brother     Cancer Brother         colon, liver, lungs, and blood    Diabetes Son     Diabetes Maternal Aunt     Diabetes Maternal Uncle     Cancer Brother     Hypertension Brother     Diabetes Sister     Hypertension Sister     Diabetes Son        Social History     Social History     Socioeconomic History    Marital status:    Tobacco Use    Smoking status: Former     Current packs/day: 0.00     Average packs/day: 1 pack/day for  30.0 years (30.0 ttl pk-yrs)     Types: Cigarettes     Start date: 1975     Quit date: 2005     Years since quittin.1    Smokeless tobacco: Never   Vaping Use    Vaping status: Never Used   Substance and Sexual Activity    Alcohol use: Not Currently     Comment: I  don't  drink every week. Just occasionally    Drug use: No     Social Drivers of Health     Food Insecurity: No Food Insecurity (2024)    Received from [facility]    Hunger Vital Sign     Worried About Running Out of Food in the Last Year: Never true     Ran Out of Food in the Last Year: Never true   Transportation Needs: No Transportation Needs (2024)    Received from [facility]    PRAPARE - Transportation     Lack of Transportation (Medical): No     Lack of Transportation (Non-Medical): No   Stress: No Stress Concern Present (3/9/2023)    [facility]    [employer] - Occupational Stress Questionnaire     Feeling of Stress : Not at all   Housing Stability: Low Risk  (2024)    Received from [facility]    Housing Stability Vital Sign     Unable to Pay for Housing in the Last Year: No     Number of Places Lived in the Last Year: 1     Unstable Housing in the Last Year: No       Allergies     Allergies[1]    Current Medications     Current Outpatient Medications   Medication Sig Dispense Refill    ezetimibe (ZETIA) 10 MG Oral Tab Take 1 tablet (10 mg total) by mouth daily. 90 tablet 3    amLODIPine Besy-Benazepril HCl 5-20 MG Oral Cap Take 1 capsule by mouth daily.      clopidogrel 75 MG Oral Tab Take 1 tablet (75 mg total) by mouth daily.      fluticasone furoate-vilanterol 200-25 MCG/ACT Inhalation Aerosol Powder, Breath Activated Inhale 1 puff into the lungs daily.      Albuterol Sulfate HFA (PROAIR HFA) 108 (90 BASE) MCG/ACT Inhalation Aero Soln Inhale 2 puffs into  the lungs every 4 (four) hours as needed for Wheezing. 1 Inhaler 2    cetirizine (ZYRTEC) 10 MG Oral Tab TAKE ONE TABLET BY MOUTH NIGHTLY 90 tablet 0    Cholecalciferol (VITAMIN D) 1000 UNITS Oral Tab Take by mouth.      Omega-3 Fatty Acids (FISH OIL) 1200 MG Oral Cap Take by mouth.      Ascorbic Acid (VITAMIN C) 1000 MG Oral Tab Take 1 tablet (1,000 mg total) by mouth daily.       No current facility-administered medications for this visit.       Review of Systems     GENERAL HEALTH: feels well otherwise  SKIN: denies any unusual skin lesions or rashes  RESPIRATORY: denies shortness of breath with exertion  CARDIOVASCULAR: denies chest pain on exertion  GI: denies abdominal pain and denies heartburn  : denies any burning with urination, urinary frequency or urgency  NEURO: denies headaches, numbness or tingling, mental status changes  PSYCH: denies depressed mood, anxiety  MUSC: denies muscle aches, joint pain    Physical Exam     /73 (BP Location: Left arm, Patient Position: Sitting, Cuff Size: adult)   Pulse 90   Ht 5' 4\" (1.626 m)   Wt 176 lb 3.2 oz (79.9 kg)   SpO2 96%   BMI 30.24 kg/m²     GENERAL: well developed, well nourished,in no apparent distress  SKIN: no rashes,no suspicious lesions  HEENT: atraumatic, normocephalic,ears and throat are clear  NECK: supple,no adenopathy,no bruits  LUNGS: clear to auscultation  CARDIO: RRR without murmur  GI: good BS's,no masses, HSM or tenderness  EXTREMITIES: no cyanosis, clubbing or edema    Assessment and Plan     Diagnoses and all orders for this visit:    Primary hypertension. Increase amlodipine-benazepril from 5-20 to 10-20 mg, but patient has a about 6 months' worth of pills at home. Therefore, will prescribe amlodipine 5 mg tabs to take with amlodipine 5-20 mg daily for a total amlodipine dosage of 10 mg; cont 20 mg of benazepril.   -     amLODIPine 5 MG Oral Tab; Take 1 tablet (5 mg total) by mouth daily. Take in addition to amlodipine-benazepril  5-20 mg daily          LORazepam 1 MG Oral Tab Take 1 tablet (1 mg total) by mouth 2 (two) times daily as needed.      amLODIPine 5 MG Oral Tab Take 1 tablet (5 mg total) by mouth daily. Take in addition to amlodipine-benazepril 5-20 mg daily 90 tablet 3    ezetimibe (ZETIA) 10 MG Oral Tab Take 1 tablet (10 mg total) by mouth daily. 90 tablet 3    amLODIPine Besy-Benazepril HCl 5-20 MG Oral Cap Take 1 capsule by mouth daily.      clopidogrel 75 MG Oral Tab Take 1 tablet (75 mg total) by mouth daily.      fluticasone furoate-vilanterol 200-25 MCG/ACT Inhalation Aerosol Powder, Breath Activated Inhale 1 puff into the lungs daily.      Albuterol Sulfate HFA (PROAIR HFA) 108 (90 BASE) MCG/ACT Inhalation Aero Soln Inhale 2 puffs into the lungs every 4 (four) hours as needed for Wheezing. 1 Inhaler 2    cetirizine (ZYRTEC) 10 MG Oral Tab TAKE ONE TABLET BY MOUTH NIGHTLY 90 tablet 0    Cholecalciferol (VITAMIN D) 1000 UNITS Oral Tab Take by mouth.      Omega-3 Fatty Acids (FISH OIL) 1200 MG Oral Cap Take by mouth.      Ascorbic Acid (VITAMIN C) 1000 MG Oral Tab Take 1 tablet (1,000 mg total) by mouth daily.         Requested Prescriptions     Signed Prescriptions Disp Refills    amLODIPine 5 MG Oral Tab 90 tablet 3     Sig: Take 1 tablet (5 mg total) by mouth daily. Take in addition to amlodipine-benazepril 5-20 mg daily       No orders of the defined types were placed in this encounter.      No follow-ups on file.    The patient indicates understanding of these issues and agrees to the plan.    Electronically signed by Sara Finch MD 02/12/25             [1]   Allergies  Allergen Reactions    Atorvastatin OTHER (SEE COMMENTS)     intolerance    Azelastine OTHER (SEE COMMENTS)     hypersensitivity    Clindamycin ANAPHYLAXIS    Pcn [Penicillin V] ANAPHYLAXIS    Pravastatin FATIGUE

## 2025-02-12 NOTE — PATIENT INSTRUCTIONS
Please increase the blood pressure medication in the following way:  - continue the amlodipine 5-20 mg daily  - take amlodipine 5 mg daily with the above medication for a total of 10 mg of amlodipine     BP goal <140/<90

## 2025-03-19 ENCOUNTER — PATIENT MESSAGE (OUTPATIENT)
Dept: CASE MANAGEMENT | Age: 75
End: 2025-03-19

## 2025-03-22 ENCOUNTER — HOSPITAL ENCOUNTER (OUTPATIENT)
Dept: MAMMOGRAPHY | Facility: HOSPITAL | Age: 75
Discharge: HOME | End: 2025-03-22
Attending: INTERNAL MEDICINE
Payer: MEDICARE

## 2025-03-22 DIAGNOSIS — Z12.31 ENCOUNTER FOR SCREENING MAMMOGRAM FOR BREAST CANCER: ICD-10-CM

## 2025-03-22 PROCEDURE — 77063 BREAST TOMOSYNTHESIS BI: CPT | Performed by: INTERNAL MEDICINE

## 2025-03-22 PROCEDURE — 77067 SCR MAMMO BI INCL CAD: CPT | Performed by: INTERNAL MEDICINE

## 2025-04-10 ENCOUNTER — MED REC SCAN ONLY (OUTPATIENT)
Dept: INTERNAL MEDICINE CLINIC | Facility: CLINIC | Age: 75
End: 2025-04-10

## 2025-05-06 ENCOUNTER — OFFICE VISIT (OUTPATIENT)
Dept: INTERNAL MEDICINE CLINIC | Facility: CLINIC | Age: 75
End: 2025-05-06
Payer: MEDICARE

## 2025-05-06 VITALS
HEIGHT: 64 IN | SYSTOLIC BLOOD PRESSURE: 126 MMHG | OXYGEN SATURATION: 98 % | HEART RATE: 99 BPM | TEMPERATURE: 98 F | WEIGHT: 178.81 LBS | RESPIRATION RATE: 18 BRPM | DIASTOLIC BLOOD PRESSURE: 79 MMHG | BODY MASS INDEX: 30.53 KG/M2

## 2025-05-06 DIAGNOSIS — R10.33 PERIUMBILICAL PAIN: ICD-10-CM

## 2025-05-06 DIAGNOSIS — I83.90 VARICOSE VEINS: Primary | ICD-10-CM

## 2025-05-06 PROCEDURE — 3008F BODY MASS INDEX DOCD: CPT | Performed by: INTERNAL MEDICINE

## 2025-05-06 PROCEDURE — 99214 OFFICE O/P EST MOD 30 MIN: CPT | Performed by: INTERNAL MEDICINE

## 2025-05-06 PROCEDURE — 1126F AMNT PAIN NOTED NONE PRSNT: CPT | Performed by: INTERNAL MEDICINE

## 2025-05-06 PROCEDURE — 3074F SYST BP LT 130 MM HG: CPT | Performed by: INTERNAL MEDICINE

## 2025-05-06 PROCEDURE — 3078F DIAST BP <80 MM HG: CPT | Performed by: INTERNAL MEDICINE

## 2025-05-06 PROCEDURE — 1159F MED LIST DOCD IN RCRD: CPT | Performed by: INTERNAL MEDICINE

## 2025-05-06 PROCEDURE — 1160F RVW MEDS BY RX/DR IN RCRD: CPT | Performed by: INTERNAL MEDICINE

## 2025-05-06 RX ORDER — VITAMIN E 268 MG
CAPSULE ORAL
COMMUNITY

## 2025-05-06 RX ORDER — FLUTICASONE FUROATE AND VILANTEROL 200; 25 UG/1; UG/1
1 POWDER RESPIRATORY (INHALATION) DAILY
COMMUNITY

## 2025-05-06 RX ORDER — LORAZEPAM 0.5 MG/1
TABLET ORAL
COMMUNITY
Start: 2024-09-04

## 2025-05-06 NOTE — PROGRESS NOTES
Mickie Price is a 74 year old female with complaints of:  Chief Complaint: Follow - Up (Patient is requesting a f/u on veins in patients legs.)    HPI     Mickie Price is a(n) 74 year old female with a history of hypertension, hyperlipidemia, COPD/emphysema, PAD, osteoporosis, IPMN's, ovarian cysts, who presents for follow up on veins in legs?.     Had varicose veins, that are aching at night.  She is followed with a vein specialist in the past, and had a procedure done varicose veins on her right leg 10 to 15 years ago.  She now notes that she has some varicose veins in the right lege leg in the upper thigh, and in left leg in her calf.  These veins do ache at night especially    Periumbilical pain.  Patient has had some periumbilical pain, do feel sharp, from a few years now.  The pain is intermittent.  Not connected to bowel movements or constipation at all.  No skin changes accompanying the pain.  She also reports some muscle tightening in the epigastric region, especially when she stands for too long.  Does state that the contour of her abdomen has been changing over the last couple years.    Past Medical History     Past Medical History[1]     Past Surgical History     Past Surgical History[2]     Family History     Family History[3]    Social History     Short Social Hx on File[4]    Allergies     Allergies[5]    Current Medications     Current Outpatient Medications   Medication Sig Dispense Refill    LORazepam 0.5 MG Oral Tab Take 1-2 tablets (0.5-1 mg total) by mouth daily as needed.      LORazepam 1 MG Oral Tab Take 1 tablet (1 mg total) by mouth 2 (two) times daily as needed.      amLODIPine 5 MG Oral Tab Take 1 tablet (5 mg total) by mouth daily. Take in addition to amlodipine-benazepril 5-20 mg daily 90 tablet 3    amLODIPine Besy-Benazepril HCl 5-20 MG Oral Cap Take 1 capsule by mouth daily.      clopidogrel 75 MG Oral Tab Take 1 tablet (75 mg total) by mouth daily.      fluticasone  furoate-vilanterol 200-25 MCG/ACT Inhalation Aerosol Powder, Breath Activated Inhale 1 puff into the lungs daily.      Albuterol Sulfate HFA (PROAIR HFA) 108 (90 BASE) MCG/ACT Inhalation Aero Soln Inhale 2 puffs into the lungs every 4 (four) hours as needed for Wheezing. 1 Inhaler 2    cetirizine (ZYRTEC) 10 MG Oral Tab TAKE ONE TABLET BY MOUTH NIGHTLY 90 tablet 0    Cholecalciferol (VITAMIN D) 1000 UNITS Oral Tab Take by mouth.      Omega-3 Fatty Acids (FISH OIL) 1200 MG Oral Cap Take by mouth.      Ascorbic Acid (VITAMIN C) 1000 MG Oral Tab Take 1 tablet (1,000 mg total) by mouth daily.      fluticasone furoate-vilanterol (BREO ELLIPTA) 200-25 MCG/ACT Inhalation Aerosol Powder, Breath Activated Inhale 1 puff into the lungs daily.      Vitamin E (VITAMIN E HIGH POTENCY) 180 MG (400 UNIT) Oral Cap       ezetimibe (ZETIA) 10 MG Oral Tab Take 1 tablet (10 mg total) by mouth daily. 90 tablet 3     No current facility-administered medications for this visit.       Review of Systems     GENERAL HEALTH: feels well otherwise  SKIN: denies any unusual skin lesions or rashes  RESPIRATORY: denies shortness of breath with exertion  CARDIOVASCULAR: denies chest pain on exertion  GI: denies abdominal pain and denies heartburn  : denies any burning with urination, urinary frequency or urgency  NEURO: denies headaches, numbness or tingling, mental status changes  PSYCH: denies depressed mood, anxiety  MUSC: denies muscle aches, joint pain    Physical Exam     /79 (BP Location: Left arm, Patient Position: Sitting, Cuff Size: adult)   Pulse 99   Temp 98 °F (36.7 °C) (Temporal)   Resp 18   Ht 5' 4\" (1.626 m)   Wt 178 lb 12.8 oz (81.1 kg)   SpO2 98%   BMI 30.69 kg/m²     GENERAL: well developed, well nourished,in no apparent distress  SKIN: no rashes,no suspicious lesions  HEENT: atraumatic, normocephalic,ears and throat are clear  NECK: supple,no adenopathy,no bruits  LUNGS: clear to auscultation  CARDIO: RRR without  murmur  GI: good BS's,no masses, HSM or tenderness  EXTREMITIES: no cyanosis, clubbing or edema    Assessment and Plan     Assessment & Plan  Varicose veins  Referral to vascular surgery given.  Orders:    Vascular Surgery - In Network    Periumbilical pain  As patient also has some squeezing/cramping sensations in the epigastrium when she stands for too long, and changing contour of the abdomen consider diastases recti.  Patient has had 3 pregnancies over the course of her life.  Abdominal ultrasound ordered to establish diagnosis and evaluate for other causes  Orders:    US ABDOMEN LIMITED (CPT=76705); Future         Current Medications[6]    Requested Prescriptions      No prescriptions requested or ordered in this encounter       No orders of the defined types were placed in this encounter.      No follow-ups on file.    The patient indicates understanding of these issues and agrees to the plan.    Electronically signed by Sara Finch MD 25             [1]   Past Medical History:   Acute, but ill-defined, cerebrovascular disease    Allergic rhinitis    Anxiety    Arthritis    Don't know    Carotid atherosclerosis    Carpal tunnel syndrome    DJD (degenerative joint disease) of knee    JOYNER (dyspnea on exertion)    Essential hypertension    Myalgia and myositis    unspecified    Vertigo   [2]   Past Surgical History:  Procedure Laterality Date    Colonoscopy  2015    Js, told to come back 5 years    Hysterectomy      , for fibroids          3, 1     Other surgical history      Parathyroid    Tubal ligation     [3]   Family History  Problem Relation Age of Onset    Diabetes Mother     Hypertension Mother     Heart Disorder Mother 66        heart attack    Cancer Father         cancer lung    Diabetes Sister         two sisters    Hypertension Sister         4 sisters    Cancer Sister         breast    Breast Cancer Sister     Diabetes Sister     Hypertension Sister     Breast  Cancer Sister     Hypertension Brother     Cancer Brother         colon, liver, lungs, and blood    Cancer Brother     Hypertension Brother     Diabetes Son     Diabetes Son     Diabetes Maternal Grandmother     Diabetes Maternal Aunt     Diabetes Maternal Uncle     Breast Cancer Niece    [4]   Social History  Socioeconomic History    Marital status:    Tobacco Use    Smoking status: Former     Current packs/day: 0.00     Average packs/day: 1 pack/day for 30.0 years (30.0 ttl pk-yrs)     Types: Cigarettes     Start date: 1975     Quit date: 2005     Years since quittin.3    Smokeless tobacco: Never   Vaping Use    Vaping status: Never Used   Substance and Sexual Activity    Alcohol use: Not Currently     Comment: I  don't  drink every week. Just occasionally    Drug use: No    Sexual activity: Not Currently     Social Drivers of Health     Food Insecurity: No Food Insecurity (2024)    Received from [facility]    Hunger Vital Sign     Worried About Running Out of Food in the Last Year: Never true     Ran Out of Food in the Last Year: Never true   Transportation Needs: No Transportation Needs (2024)    Received from [facility]    PRAPARE - Transportation     Lack of Transportation (Medical): No     Lack of Transportation (Non-Medical): No   Stress: No Stress Concern Present (3/9/2023)    Received from [facility]    [employer] [place] [employer] - Occupational Stress Questionnaire     Feeling of Stress : Not at all   Housing Stability: Low Risk  (2024)    Received from [facility]    Housing Stability Vital Sign     Unable to Pay for Housing in the Last Year: No     Number of Places Lived in the Last Year: 1     Unstable Housing in the Last Year: No   [5]   Allergies  Allergen Reactions    Atorvastatin OTHER (SEE COMMENTS)     intolerance    Fatigue    Fatigue    intolerance    Fatigue     Pravastatin FATIGUE and OTHER (SEE COMMENTS)     fatigue    Clindamycin ANAPHYLAXIS and RASH    Pcn [Penicillin V] ANAPHYLAXIS    Azelastine OTHER (SEE COMMENTS) and RASH     hypersensitivity    hypersensitivity   hypersensitivity    Penicillins ITCHING, OTHER (SEE COMMENTS) and RASH     Unknown reaction 40 years ago    Other reaction(s): Itching   [6]    LORazepam 0.5 MG Oral Tab Take 1-2 tablets (0.5-1 mg total) by mouth daily as needed.      LORazepam 1 MG Oral Tab Take 1 tablet (1 mg total) by mouth 2 (two) times daily as needed.      amLODIPine 5 MG Oral Tab Take 1 tablet (5 mg total) by mouth daily. Take in addition to amlodipine-benazepril 5-20 mg daily 90 tablet 3    amLODIPine Besy-Benazepril HCl 5-20 MG Oral Cap Take 1 capsule by mouth daily.      clopidogrel 75 MG Oral Tab Take 1 tablet (75 mg total) by mouth daily.      fluticasone furoate-vilanterol 200-25 MCG/ACT Inhalation Aerosol Powder, Breath Activated Inhale 1 puff into the lungs daily.      Albuterol Sulfate HFA (PROAIR HFA) 108 (90 BASE) MCG/ACT Inhalation Aero Soln Inhale 2 puffs into the lungs every 4 (four) hours as needed for Wheezing. 1 Inhaler 2    cetirizine (ZYRTEC) 10 MG Oral Tab TAKE ONE TABLET BY MOUTH NIGHTLY 90 tablet 0    Cholecalciferol (VITAMIN D) 1000 UNITS Oral Tab Take by mouth.      Omega-3 Fatty Acids (FISH OIL) 1200 MG Oral Cap Take by mouth.      Ascorbic Acid (VITAMIN C) 1000 MG Oral Tab Take 1 tablet (1,000 mg total) by mouth daily.

## 2025-05-07 ENCOUNTER — OFFICE VISIT (OUTPATIENT)
Facility: CLINIC | Age: 75
End: 2025-05-07
Payer: MEDICARE

## 2025-05-07 VITALS
BODY MASS INDEX: 30.56 KG/M2 | DIASTOLIC BLOOD PRESSURE: 68 MMHG | HEIGHT: 64 IN | SYSTOLIC BLOOD PRESSURE: 128 MMHG | WEIGHT: 179 LBS

## 2025-05-07 DIAGNOSIS — I83.813 VARICOSE VEINS OF BILATERAL LOWER EXTREMITIES WITH PAIN: Primary | ICD-10-CM

## 2025-05-07 PROCEDURE — 3078F DIAST BP <80 MM HG: CPT

## 2025-05-07 PROCEDURE — 1159F MED LIST DOCD IN RCRD: CPT

## 2025-05-07 PROCEDURE — 3008F BODY MASS INDEX DOCD: CPT

## 2025-05-07 PROCEDURE — 3074F SYST BP LT 130 MM HG: CPT

## 2025-05-07 PROCEDURE — 99203 OFFICE O/P NEW LOW 30 MIN: CPT

## 2025-05-07 PROCEDURE — 1160F RVW MEDS BY RX/DR IN RCRD: CPT

## 2025-05-07 NOTE — PROGRESS NOTES
VASCULAR SURGERY CONSULT NOTE      Mickie Price   :  1950  MR#  [id number]    REFERRING PHYSICIAN:  Sara Finch  PRIMARY CARE PHYSICIAN:  BERNY HOGAN    Chief Complaint   Patient presents with    Referral     Referral from Dr. Wale Finch for Bilateral Varicose Veins.  The patient is walking about 1 to 2 miles daily.  She reports Bilateral leg pain.  The patient has increased pain in the Left calf and Upper Inner Right thigh. Onset about two years.  The patient states pain increased at night.         HPI:    The patient is a 74 year old female who has been referred to the clinic today for an evaluation of her bilateral lower extremity heaviness, tiredness, discoloration, edema, and pain after prolonged standing. The pain is reported in her upper medial thighs, calves, and distal legs. This is interfering with her activities of daily living and exercise. She has not worn compression stockings in the recent past. She had previously underwent bilateral groin vein stripping with Dr. Bliss at [facility] in 2018 and 2019 (records will be obtained).   She additionally endorses a medical history of bilateral knee degenerative joint disease, chronic kidney disease stage 3, and osteoporosis.     PAST MEDICAL HISTORY:   Past Medical History[1]    PAST SURGICAL HISTORY:   Past Surgical History[2]     MEDICATIONS:   Current Medications[3]    ALLERGIES:   Allergies[4]    SOCIAL HISTORY:   Short Social Hx on File[5]     FAMILY HISTORY:   Family History[6]    ROS:   A 12 point review of systems with pertinent positives and negatives listed in the HPI.    PHYSICAL EXAM:   /68 (BP Location: Left arm, Patient Position: Sitting)   Ht 5' 4\" (1.626 m)   Wt 179 lb (81.2 kg)   BMI 30.73 kg/m²   GENERAL: alert and orientated X 3, well developed, well nourished, in no apparent distress  HEENT: ears and throat are clear  NECK: supple, no lymphadenopathy, thyroid wnl  CAROTID: No bruits  RESPIRATORY: no rales,  rhonchi, or wheezes B  CARDIO: RRR without murmur, no murmur, no gallop   ABDOMEN: soft, non-tender with no palpable aneurysm or masses  BACK: normal, no tenderness  SKIN: no rashes, warm and dry  NEURO/PSYCH: orientated x3, normal mood and affect, no sensory or motor deficit  EXTREMITIES: full range of motion, no tenderness or edema in either leg.   VASCULAR  Pulse exam right: femoral 2+, DP  2+, PT  2+  Pulse exam left: femoral  2+, DP  2+, PT  2+      Vein Assessment:      Mild scattered bilateral  LE bulgy varicose veins with mild hemosiderin deposition.     IMPRESSION:   Bilateral  lower extremity pain due to venous insufficiency.   Symptomatic calf varicosities.    PLAN:     We reviewed the options for management of venous insufficiency, including conservative therapy, sclerotherapy, stab phlebectomy, and endovenous laser ablation. The patient was educated in the benign condition of venous disease.  I have given the patient a prescription for Grade II compression stockings (20-30 mmHg, waist-highs). We reviewed the importance of wearing these daily and consistently. We also reviewed other types of conservative measures, such as periodic leg elevation, walking for exercise, analgesic use, attempts at weight loss, and the avoidance of prolonged standing.  I have sent the patient for a venous reflux ultrasound. Should the ultrasound study reveal venous reflux with dilation and she does not have relief of symptoms with conservative therapy, then endovenous laser ablation may be a possible treatment option.  I explained to the patient that her insurance company may require a 6-12 week trial period of conservative therapy prior to authorizing endovenous ablation treatment.  The patient understood and agreed to proceed with this treatment plan, all of her questions were answered during this clinic visit.       Thank you for allowing to participate in the care of your patient.     GRACE Jaeger  Division of  Vascular Surgery.        [1]   Past Medical History:   Acute, but ill-defined, cerebrovascular disease    Allergic rhinitis    Anxiety    Arthritis    Don't know    Carotid atherosclerosis    Carpal tunnel syndrome    DJD (degenerative joint disease) of knee    JOYNER (dyspnea on exertion)    Essential hypertension    Myalgia and myositis    unspecified    Vertigo   [2]   Past Surgical History:  Procedure Laterality Date    Colonoscopy  2015    Js, told to come back 5 years    Hysterectomy      , for fibroids          3, 1     Other surgical history      Parathyroid    Tubal ligation     [3]   Current Outpatient Medications   Medication Sig Dispense Refill    fluticasone furoate-vilanterol (BREO ELLIPTA) 200-25 MCG/ACT Inhalation Aerosol Powder, Breath Activated Inhale 1 puff into the lungs daily.      LORazepam 0.5 MG Oral Tab Take 1-2 tablets (0.5-1 mg total) by mouth daily as needed.      Vitamin E (VITAMIN E HIGH POTENCY) 180 MG (400 UNIT) Oral Cap       LORazepam 1 MG Oral Tab Take 1 tablet (1 mg total) by mouth 2 (two) times daily as needed.      amLODIPine 5 MG Oral Tab Take 1 tablet (5 mg total) by mouth daily. Take in addition to amlodipine-benazepril 5-20 mg daily 90 tablet 3    ezetimibe (ZETIA) 10 MG Oral Tab Take 1 tablet (10 mg total) by mouth daily. 90 tablet 3    amLODIPine Besy-Benazepril HCl 5-20 MG Oral Cap Take 1 capsule by mouth daily.      clopidogrel 75 MG Oral Tab Take 1 tablet (75 mg total) by mouth daily.      fluticasone furoate-vilanterol 200-25 MCG/ACT Inhalation Aerosol Powder, Breath Activated Inhale 1 puff into the lungs daily.      Albuterol Sulfate HFA (PROAIR HFA) 108 (90 BASE) MCG/ACT Inhalation Aero Soln Inhale 2 puffs into the lungs every 4 (four) hours as needed for Wheezing. 1 Inhaler 2    cetirizine (ZYRTEC) 10 MG Oral Tab TAKE ONE TABLET BY MOUTH NIGHTLY 90 tablet 0    Cholecalciferol (VITAMIN D) 1000 UNITS Oral Tab Take by mouth.      Omega-3  Fatty Acids (FISH OIL) 1200 MG Oral Cap Take by mouth.      Ascorbic Acid (VITAMIN C) 1000 MG Oral Tab Take 1 tablet (1,000 mg total) by mouth daily.     [4]   Allergies  Allergen Reactions    Atorvastatin OTHER (SEE COMMENTS)     intolerance    Fatigue    Fatigue    intolerance    Fatigue    Pravastatin FATIGUE and OTHER (SEE COMMENTS)     fatigue    Clindamycin ANAPHYLAXIS and RASH    Pcn [Penicillin V] ANAPHYLAXIS    Azelastine OTHER (SEE COMMENTS) and RASH     hypersensitivity    hypersensitivity   hypersensitivity    Penicillins ITCHING, OTHER (SEE COMMENTS) and RASH     Unknown reaction 40 years ago    Other reaction(s): Itching   [5]   Social History  Socioeconomic History    Marital status:    Tobacco Use    Smoking status: Former     Current packs/day: 0.00     Average packs/day: 1 pack/day for 30.0 years (30.0 ttl pk-yrs)     Types: Cigarettes     Start date: 1975     Quit date: 2005     Years since quittin.3    Smokeless tobacco: Never   Vaping Use    Vaping status: Never Used   Substance and Sexual Activity    Alcohol use: Not Currently     Comment: I  don't  drink every week. Just occasionally    Drug use: No    Sexual activity: Not Currently     Social Drivers of Health     Food Insecurity: No Food Insecurity (2025)    NCSS - Food Insecurity     Worried About Running Out of Food in the Last Year: No     Ran Out of Food in the Last Year: No   Transportation Needs: No Transportation Needs (2025)    NCSS - Transportation     Lack of Transportation: No   Stress: No Stress Concern Present (3/9/2023)    Received from [facility]    [employer] [place] [employer] - Occupational Stress Questionnaire     Feeling of Stress : Not at all   Housing Stability: Not At Risk (2025)    NCSS - Housing/Utilities     Has Housing: Yes     Worried About Losing Housing: No     Unable to Get Utilities: No   [6]   Family History  Problem Relation Age of Onset     Diabetes Mother     Hypertension Mother     Heart Disorder Mother 66        heart attack    Cancer Father         cancer lung    Diabetes Sister         two sisters    Hypertension Sister         4 sisters    Cancer Sister         breast    Breast Cancer Sister     Diabetes Sister     Hypertension Sister     Breast Cancer Sister     Hypertension Brother     Cancer Brother         colon, liver, lungs, and blood    Cancer Brother     Hypertension Brother     Diabetes Son     Diabetes Son     Diabetes Maternal Grandmother     Diabetes Maternal Aunt     Diabetes Maternal Uncle     Breast Cancer Niece

## 2025-05-08 ENCOUNTER — TELEPHONE (OUTPATIENT)
Facility: CLINIC | Age: 75
End: 2025-05-08

## 2025-05-08 NOTE — TELEPHONE ENCOUNTER
Left voicemail for patient to callback office to schedule ultrasound, if patient calls back please transfer to [place] at [phone]

## 2025-05-14 ENCOUNTER — HOSPITAL ENCOUNTER (OUTPATIENT)
Dept: MRI IMAGING | Facility: HOSPITAL | Age: 75
Discharge: HOME | End: 2025-05-14
Attending: INTERNAL MEDICINE
Payer: MEDICARE

## 2025-05-14 DIAGNOSIS — D49.0 IPMN (INTRADUCTAL PAPILLARY MUCINOUS NEOPLASM): ICD-10-CM

## 2025-05-14 PROCEDURE — A9575 INJ GADOTERATE MEGLUMI 0.1ML: HCPCS | Performed by: INTERNAL MEDICINE

## 2025-05-14 PROCEDURE — 74183 MRI ABD W/O CNTR FLWD CNTR: CPT | Performed by: INTERNAL MEDICINE

## 2025-05-14 RX ORDER — GADOTERATE MEGLUMINE 376.9 MG/ML
20 INJECTION INTRAVENOUS
Status: COMPLETED | OUTPATIENT
Start: 2025-05-14 | End: 2025-05-14

## 2025-05-14 RX ADMIN — GADOTERATE MEGLUMINE 16 ML: 376.9 INJECTION INTRAVENOUS at 08:50:00

## 2025-05-20 ENCOUNTER — TELEPHONE (OUTPATIENT)
Facility: CLINIC | Age: 75
End: 2025-05-20

## 2025-05-20 NOTE — TELEPHONE ENCOUNTER
HIPAA release for medical records was sent by fax to Dr. Ej Bliss 5-7-2025, 5-8-2025 and 5-.  Fax number [phone].    Rhonda

## 2025-05-30 ENCOUNTER — OFFICE VISIT (OUTPATIENT)
Dept: INTERNAL MEDICINE CLINIC | Facility: CLINIC | Age: 75
End: 2025-05-30

## 2025-05-30 ENCOUNTER — TELEPHONE (OUTPATIENT)
Facility: LOCATION | Age: 75
End: 2025-05-30

## 2025-05-30 VITALS
DIASTOLIC BLOOD PRESSURE: 70 MMHG | OXYGEN SATURATION: 97 % | SYSTOLIC BLOOD PRESSURE: 122 MMHG | RESPIRATION RATE: 18 BRPM | WEIGHT: 178 LBS | TEMPERATURE: 98 F | BODY MASS INDEX: 30.39 KG/M2 | HEART RATE: 74 BPM | HEIGHT: 64 IN

## 2025-05-30 DIAGNOSIS — E27.9 ADRENAL NODULE (HCC): Primary | ICD-10-CM

## 2025-05-30 PROCEDURE — 1159F MED LIST DOCD IN RCRD: CPT | Performed by: INTERNAL MEDICINE

## 2025-05-30 PROCEDURE — 3074F SYST BP LT 130 MM HG: CPT | Performed by: INTERNAL MEDICINE

## 2025-05-30 PROCEDURE — 3008F BODY MASS INDEX DOCD: CPT | Performed by: INTERNAL MEDICINE

## 2025-05-30 PROCEDURE — 1170F FXNL STATUS ASSESSED: CPT | Performed by: INTERNAL MEDICINE

## 2025-05-30 PROCEDURE — 3078F DIAST BP <80 MM HG: CPT | Performed by: INTERNAL MEDICINE

## 2025-05-30 PROCEDURE — 1160F RVW MEDS BY RX/DR IN RCRD: CPT | Performed by: INTERNAL MEDICINE

## 2025-05-30 PROCEDURE — 99213 OFFICE O/P EST LOW 20 MIN: CPT | Performed by: INTERNAL MEDICINE

## 2025-05-30 PROCEDURE — 1126F AMNT PAIN NOTED NONE PRSNT: CPT | Performed by: INTERNAL MEDICINE

## 2025-05-30 RX ORDER — EAR PLUGS
EACH OTIC (EAR)
COMMUNITY

## 2025-05-30 RX ORDER — BACLOFEN 20 MG
TABLET ORAL
COMMUNITY
Start: 2024-04-01

## 2025-05-30 RX ORDER — MULTIVITAMIN WITH IRON
250 TABLET ORAL DAILY
COMMUNITY

## 2025-05-30 RX ORDER — CYCLOBENZAPRINE HCL 10 MG
1 TABLET ORAL NIGHTLY PRN
COMMUNITY
Start: 2024-03-01

## 2025-05-30 RX ORDER — ENZYMES,DIGESTIVE
CAPSULE ORAL DAILY
COMMUNITY

## 2025-05-30 NOTE — PROGRESS NOTES
Mickie Price is a 74 year old female with complaints of:  Chief Complaint: Cyst (Pancreatic cyst / )    HPI     Mickie Price is a(n) 74 year old female with a history of hypertension, hyperlipidemia, COPD/emphysema, PAD, osteoporosis, IPMN's, ovarian cysts, who presents for follow up.     Discussed recent MRI findings.  Patient has the disc from images from the MRI of the pancreas that was done at [place] in 2023.  She will follow-up with oncology to discuss findings from her most recent MRI.    Past Medical History     Past Medical History[1]     Past Surgical History     Past Surgical History[2]     Family History     Family History[3]    Social History     Short Social Hx on File[4]    Allergies     Allergies[5]    Current Medications     Current Outpatient Medications   Medication Sig Dispense Refill    Probiotic Product (PROBIOTIC ADVANCED OR)       magnesium 250 MG Oral Tab Take 1 tablet (250 mg total) by mouth daily.      Magnesium Oxide -Mg Supplement ([facility] MAGNESIUM) 500 MG Oral Tab       Digestive Enzymes (ENZYME DIGEST) Oral Cap Take by mouth daily.      cyclobenzaprine 10 MG Oral Tab Take 1 tablet (10 mg total) by mouth nightly as needed.      Cyanocobalamin (VITAMIN B-12) 1000 MCG/15ML Oral Liquid       fluticasone furoate-vilanterol (BREO ELLIPTA) 200-25 MCG/ACT Inhalation Aerosol Powder, Breath Activated Inhale 1 puff into the lungs daily.      LORazepam 0.5 MG Oral Tab Take 1-2 tablets (0.5-1 mg total) by mouth daily as needed.      Vitamin E (VITAMIN E HIGH POTENCY) 180 MG (400 UNIT) Oral Cap       LORazepam 1 MG Oral Tab Take 1 tablet (1 mg total) by mouth 2 (two) times daily as needed.      amLODIPine Besy-Benazepril HCl 5-20 MG Oral Cap Take 1 capsule by mouth daily.      clopidogrel 75 MG Oral Tab Take 1 tablet (75 mg total) by mouth daily.      Albuterol Sulfate HFA (PROAIR HFA) 108 (90 BASE) MCG/ACT Inhalation Aero Soln Inhale 2 puffs into the lungs every 4 (four) hours as needed for Wheezing.  1 Inhaler 2    cetirizine (ZYRTEC) 10 MG Oral Tab TAKE ONE TABLET BY MOUTH NIGHTLY 90 tablet 0    Cholecalciferol (VITAMIN D) 1000 UNITS Oral Tab Take by mouth.      Omega-3 Fatty Acids (FISH OIL) 1200 MG Oral Cap Take by mouth.      Ascorbic Acid (VITAMIN C) 1000 MG Oral Tab Take 1 tablet (1,000 mg total) by mouth daily.      amLODIPine 5 MG Oral Tab Take 1 tablet (5 mg total) by mouth daily. Take in addition to amlodipine-benazepril 5-20 mg daily (Patient not taking: Reported on 5/30/2025) 90 tablet 3    ezetimibe (ZETIA) 10 MG Oral Tab Take 1 tablet (10 mg total) by mouth daily. (Patient not taking: Reported on 5/30/2025) 90 tablet 3    fluticasone furoate-vilanterol 200-25 MCG/ACT Inhalation Aerosol Powder, Breath Activated Inhale 1 puff into the lungs daily. (Patient not taking: Reported on 5/30/2025)       No current facility-administered medications for this visit.       Review of Systems     GENERAL HEALTH: feels well otherwise  SKIN: denies any unusual skin lesions or rashes  RESPIRATORY: denies shortness of breath with exertion  CARDIOVASCULAR: denies chest pain on exertion  GI: denies abdominal pain and denies heartburn  : denies any burning with urination, urinary frequency or urgency  NEURO: denies headaches, numbness or tingling, mental status changes  PSYCH: denies depressed mood, anxiety  MUSC: denies muscle aches, joint pain    Physical Exam     /70 (BP Location: Left arm, Patient Position: Sitting, Cuff Size: adult)   Pulse 74   Temp 97.6 °F (36.4 °C) (Temporal)   Resp 18   Ht 5' 4\" (1.626 m)   Wt 178 lb (80.7 kg)   SpO2 97%   BMI 30.55 kg/m²     GENERAL: well developed, well nourished,in no apparent distress  SKIN: no rashes,no suspicious lesions  HEENT: atraumatic, normocephalic,ears and throat are clear  NECK: supple,no adenopathy,no bruits  LUNGS: clear to auscultation  CARDIO: RRR without murmur  GI: good BS's,no masses, HSM or tenderness  EXTREMITIES: no cyanosis, clubbing or  edema    Assessment and Plan     Assessment & Plan  Adrenal nodule (HCC)  will follow-up with oncology.            Current Medications[6]    Requested Prescriptions      No prescriptions requested or ordered in this encounter       No orders of the defined types were placed in this encounter.      No follow-ups on file.    The patient indicates understanding of these issues and agrees to the plan.    Electronically signed by Sara Finch MD 25             [1]   Past Medical History:   Acute, but ill-defined, cerebrovascular disease    Allergic rhinitis    Anxiety    Arthritis    Don't know    Carotid atherosclerosis    Carpal tunnel syndrome    DJD (degenerative joint disease) of knee    JOYNER (dyspnea on exertion)    Essential hypertension    Myalgia and myositis    unspecified    Vertigo   [2]   Past Surgical History:  Procedure Laterality Date    Colonoscopy  2015    [place], told to come back 5 years    Hysterectomy      , for fibroids          3, 1     Other surgical history      Parathyroid    Tubal ligation     [3]   Family History  Problem Relation Age of Onset    Diabetes Mother     Hypertension Mother     Heart Disorder Mother 66        heart attack    Cancer Father         cancer lung    Diabetes Sister         two sisters    Hypertension Sister         4 sisters    Cancer Sister         breast    Breast Cancer Sister     Diabetes Sister     Hypertension Sister     Breast Cancer Sister     Hypertension Brother     Cancer Brother         colon, liver, lungs, and blood    Cancer Brother     Hypertension Brother     Diabetes Son     Diabetes Son     Diabetes Maternal Grandmother     Diabetes Maternal Aunt     Diabetes Maternal Uncle     Breast Cancer Niece    [4]   Social History  Socioeconomic History    Marital status:    Tobacco Use    Smoking status: Former     Current packs/day: 0.00     Average packs/day: 1 pack/day for 30.0 years (30.0 ttl pk-yrs)     Types:  Cigarettes     Start date: 1975     Quit date: 2005     Years since quittin.4    Smokeless tobacco: Never   Vaping Use    Vaping status: Never Used   Substance and Sexual Activity    Alcohol use: Not Currently     Comment: I  don't  drink every week. Just occasionally    Drug use: No    Sexual activity: Not Currently     Partners: Male     Social Drivers of Health     Food Insecurity: No Food Insecurity (2025)    NCSS - Food Insecurity     Worried About Running Out of Food in the Last Year: No     Ran Out of Food in the Last Year: No   Transportation Needs: No Transportation Needs (2025)    NCSS - Transportation     Lack of Transportation: No   Stress: No Stress Concern Present (3/9/2023)    Received from [facility] Occupational Health - Occupational Stress Questionnaire     Feeling of Stress : Not at all   Housing Stability: Not At Risk (2025)    NCSS - Housing/Utilities     Has Housing: Yes     Worried About Losing Housing: No     Unable to Get Utilities: No   [5]   Allergies  Allergen Reactions    Atorvastatin OTHER (SEE COMMENTS)     intolerance    Fatigue    Fatigue    intolerance    Fatigue    Pravastatin FATIGUE and OTHER (SEE COMMENTS)     fatigue    Clindamycin ANAPHYLAXIS and RASH    Pcn [Penicillin V] ANAPHYLAXIS    Azelastine OTHER (SEE COMMENTS) and RASH     hypersensitivity    hypersensitivity   hypersensitivity    Penicillins ITCHING, OTHER (SEE COMMENTS) and RASH     Unknown reaction 40 years ago    Other reaction(s): Itching   [6]    Probiotic Product (PROBIOTIC ADVANCED OR)       magnesium 250 MG Oral Tab Take 1 tablet (250 mg total) by mouth daily.      Magnesium Oxide -Mg Supplement ([employer] MAGNESIUM) 500 MG Oral Tab       Digestive Enzymes (ENZYME DIGEST) Oral Cap Take by mouth daily.      cyclobenzaprine 10 MG Oral Tab Take 1 tablet (10 mg total) by mouth nightly as needed.      Cyanocobalamin (VITAMIN B-12) 1000 MCG/15ML  Oral Liquid       fluticasone furoate-vilanterol (BREO ELLIPTA) 200-25 MCG/ACT Inhalation Aerosol Powder, Breath Activated Inhale 1 puff into the lungs daily.      LORazepam 0.5 MG Oral Tab Take 1-2 tablets (0.5-1 mg total) by mouth daily as needed.      Vitamin E (VITAMIN E HIGH POTENCY) 180 MG (400 UNIT) Oral Cap       LORazepam 1 MG Oral Tab Take 1 tablet (1 mg total) by mouth 2 (two) times daily as needed.      amLODIPine Besy-Benazepril HCl 5-20 MG Oral Cap Take 1 capsule by mouth daily.      clopidogrel 75 MG Oral Tab Take 1 tablet (75 mg total) by mouth daily.      Albuterol Sulfate HFA (PROAIR HFA) 108 (90 BASE) MCG/ACT Inhalation Aero Soln Inhale 2 puffs into the lungs every 4 (four) hours as needed for Wheezing. 1 Inhaler 2    cetirizine (ZYRTEC) 10 MG Oral Tab TAKE ONE TABLET BY MOUTH NIGHTLY 90 tablet 0    Cholecalciferol (VITAMIN D) 1000 UNITS Oral Tab Take by mouth.      Omega-3 Fatty Acids (FISH OIL) 1200 MG Oral Cap Take by mouth.      Ascorbic Acid (VITAMIN C) 1000 MG Oral Tab Take 1 tablet (1,000 mg total) by mouth daily.

## 2025-06-05 ENCOUNTER — OFFICE VISIT (OUTPATIENT)
Age: 75
End: 2025-06-05
Attending: INTERNAL MEDICINE
Payer: MEDICARE

## 2025-06-05 VITALS
DIASTOLIC BLOOD PRESSURE: 66 MMHG | TEMPERATURE: 97 F | SYSTOLIC BLOOD PRESSURE: 124 MMHG | BODY MASS INDEX: 31 KG/M2 | OXYGEN SATURATION: 95 % | RESPIRATION RATE: 16 BRPM | WEIGHT: 183 LBS | HEART RATE: 89 BPM

## 2025-06-05 DIAGNOSIS — E27.9 ADRENAL NODULE (HCC): Primary | ICD-10-CM

## 2025-06-05 DIAGNOSIS — D49.0 IPMN (INTRADUCTAL PAPILLARY MUCINOUS NEOPLASM): ICD-10-CM

## 2025-06-05 NOTE — PROGRESS NOTES
Cancer Center Consultation Note    Patient Name: Mickie Price   YOB: 1950   Medical Record Number: [id number]   CSN: [id number]   Consulting Physician: Rodolfo Caruso MD  Referring Physician(s): Sara Finch  Date of Consultation: 6/5/2025     Reason for Consultation:  Bilateral Adrenal Nodules, IPMN    History of Present Illness:     Mickie Price is a 74 year old female that was seen today in the Cancer Center for evaluation of the above condition. Patient has PMH relevant for a hx of IPMN that has been followed and bilateral adrenal nodules noted since 2019 per patient.  Patient has been referred to medical oncology in light of recent MRI imaging on May 14, 2025 by referring PCP.  Patient is new to our [facility] system as she previously followed at [facility] for her medical conditions but needed to switch due to insurance reasons.  Patient has a 1.6 cm x 1.4cm x 1.7cm ,T2 hyperintense lesion in the tail of the pancreas which appears to be contiguous main pancreatic duct re: her hx of IPMN.  Patient provided outside hospital CT scan as well as MRI report from February 2023 obtained at [facility] where the pt was noted to have 3 adrenal nodules.  Currently, left adrenal nodule is measuring 1.9 x 1.3 x 1.3 cm which is an increase from 1.6 x 1.0 cm on prior imaging. There were 2 nodules in the right adrenal gland.  Current measurements are 1.5 x 1.6 x 1.6 cm with a second adrenal nodule measuring 3 x 1.5 cm. Pt reports doing well over the last 6 to 12 months.  Patient denies any focus of abdominal or back pain.  No systemic signs of illness.  No reports of bleeding.  No other concerns on ROS for chest pain, dyspnea, nausea, vomiting or any other symptoms.    Past Medical History:  Past Medical History:    Acute, but ill-defined, cerebrovascular disease    Allergic rhinitis    Anxiety    Carotid atherosclerosis    Carpal tunnel syndrome    DJD (degenerative joint disease) of knee    JOYNER (dyspnea on  exertion)    Essential hypertension    Hyperparathyroidism (HCC)    s/p removal of 2 parathyroid glands at [place]    Myalgia and myositis    unspecified    Osteoarthritis    Vertigo       Past Surgical History:  Past Surgical History[1]    Family Medical History:  Family History[2]    Gyne History:  OB History    Para Term  AB Living   3 3 0 0 0 0   SAB IAB Ectopic Multiple Live Births   0 0 0 0 0       Social History:  Social History     Socioeconomic History    Marital status:      Spouse name: Not on file    Number of children: Not on file    Years of education: Not on file    Highest education level: Not on file   Occupational History    Not on file   Tobacco Use    Smoking status: Former     Current packs/day: 0.00     Average packs/day: 1 pack/day for 30.0 years (30.0 ttl pk-yrs)     Types: Cigarettes     Start date: 1975     Quit date: 2005     Years since quittin.4    Smokeless tobacco: Never    Tobacco comments:     1 ppd x 30 yrs   Vaping Use    Vaping status: Never Used   Substance and Sexual Activity    Alcohol use: Not Currently     Comment: occassional    Drug use: No    Sexual activity: Not Currently     Partners: Male   Other Topics Concern    Not on file   Social History Narrative    Tori is . She has 2 adult children. Patient is retired from working in [employer] since . She lives with her brother and granddaughter in [place], [place].     Social Drivers of Health     Food Insecurity: No Food Insecurity (2025)    NCSS - Food Insecurity     Worried About Running Out of Food in the Last Year: No     Ran Out of Food in the Last Year: No   Transportation Needs: No Transportation Needs (2025)    NCSS - Transportation     Lack of Transportation: No   Housing Stability: Not At Risk (2025)    NCSS - Housing/Utilities     Has Housing: Yes     Worried About Losing Housing: No     Unable to Get Utilities: No       Allergies:   Allergies[3]    Current  Medications:  Current Medications[4]    Review of Systems:    Constitutional: Negative for anorexia, chills, fatigue, fevers, night sweats and weight loss.  Eyes: Negative for visual disturbance, irritation and redness.  Respiratory: Negative for cough, hemoptysis, chest pain, or dyspnea on exertion.  Gastrointestinal: Negative for dysphagia, odynophagia, reflux symptoms, nausea, vomiting, change in bowel habits, diarrhea, constipation and abdominal pain.  Integument/breast: Negative for rash, skin lesions, and pruritus.  Hematologic/lymphatic: Negative for easy bruising, bleeding, and lymphadenopathy.  Musculoskeletal: Negative for myalgias, arthralgias, muscle weakness.  Neurological: Negative for headaches, dizziness, speech problems, gait problems and weakness.    A comprehensive 14 point review of systems was completed.  Pertinent positives and negatives noted in the the HPI.     Vital Signs:  /66 (BP Location: Left arm, Patient Position: Sitting, Cuff Size: large)   Pulse 89   Temp 96.9 °F (36.1 °C) (Tympanic)   Resp 16   Wt 83 kg (183 lb)   SpO2 95%   BMI 31.41 kg/m²     Physical Examination:    General: Patient is alert and oriented x 3, not in acute distress.  Psych:  Friendly, cooperative with appropriate questions and responses  HEENT: EOMs intact. Oropharynx is clear.   Neck: No palpable lymphadenopathy. Neck is supple.  Lymphatics: There is no palpable lymphadenopathy throughout in the cervical, supraclavicular, axillary regions.  Chest: Clear to auscultation. No wheezes or rales.  Heart: Regular rate and rhythm. S1S2 normal.  Abdomen: Soft, non tender with good bowel sounds.   Extremities: No edema or calf tenderness.  Neurological: Grossly intact.     Performance Status:    ECOG 0: Fully active, able to carry on all pre-disease performance without restriction    Labs:    Lab Results   Component Value Date/Time    WBC 5.9 02/08/2025 07:30 AM    RBC 4.98 02/08/2025 07:30 AM    HGB 15.9  02/08/2025 07:30 AM    HCT 46.9 02/08/2025 07:30 AM    MCV 94.2 02/08/2025 07:30 AM    MCH 31.9 02/08/2025 07:30 AM    MCHC 33.9 02/08/2025 07:30 AM    RDW 13.2 02/08/2025 07:30 AM    NEPRELIM 3.10 02/08/2025 07:30 AM    .0 02/08/2025 07:30 AM       Lab Results   Component Value Date/Time    GLU 91 02/08/2025 07:30 AM    BUN 16 02/08/2025 07:30 AM    CREATSERUM 1.21 (H) 02/08/2025 07:30 AM    GFRNAA 51 (L) 12/21/2015 07:36 AM    CA 9.5 02/08/2025 07:30 AM    ALB 4.4 02/08/2025 07:30 AM     02/08/2025 07:30 AM    K 4.4 02/08/2025 07:30 AM     02/08/2025 07:30 AM    CO2 30.0 02/08/2025 07:30 AM    ALKPHO 64 02/08/2025 07:30 AM    AST 16 02/08/2025 07:30 AM    ALT 13 02/08/2025 07:30 AM       Imaging:    MRI with MRCP w/w/o from 5/14/25    Pathology:    N/A    Impression:      ICD-10-CM    1. Adrenal nodule (HCC)  E27.9       2. IPMN (intraductal papillary mucinous neoplasm)  D49.0         74 year old W with PMH relevant for  hx of IPMN that has been followed and bilateral adrenal nodules noted since 2019 presents for enlarging bilateral adrenal nodules    Plan:    1.) Bilateral Adrenal Nodules    --Patient was kind enough to provide a copy of her imaging from MRI with MRCP from February 2023 obtained at [place]  --we will review her prior MRI results with current MRI with MRCP from May 14, 2025 at our multidisciplinary tumor board on Tuesday of next week as her left adrenal nodule has increased to 1.9 x 1.3 x 1.3 cm, and one of the right adrenal nodules has increased to 3 x 1.5 cm    --Discussed with the patient and her daughter that she might need to be evaluated by surgical oncology who might require additional testing prior to consideration of biopsy and/or surgical removal as these were felt to be adenomas in the past, but are enlarging  --no additional imaging plans at this time  --will plan to phone pt s/p tumor board discussion with recommendations   --f/u in 4 wks with me    2.)  IPMN    --discussed with pt and her daughter that these are typically followed routinely and if they grow larger than 3 cm would recommend surgical removal with surgical oncology    MDM: Moderate Risk    Rodolfo Caruso MD  [facility]  Mirtha Sharif [facility] [place]  [address]  [phone]           [1]   Past Surgical History:  Procedure Laterality Date    Colonoscopy  2015    [place], told to come back 5 years    Hysterectomy      , for fibroids          3, 1     Other surgical history      Parathyroid    Tubal ligation     [2]   Family History  Problem Relation Age of Onset    Diabetes Mother     Hypertension Mother     Heart Disorder Mother 66        heart attack    Cancer Father 80        cancer lung;tobacco user    Diabetes Sister         two sisters    Hypertension Sister         4 sisters    Breast Cancer Sister 55    Breast Cancer Sister 80    Diabetes Sister     Hypertension Sister     Hypertension Brother     Cancer Brother 57        colon, liver, lungs    Cancer Brother 54        lung cancer; tobacco user    Hypertension Brother     Diabetes Maternal Grandmother     Diabetes Son     Diabetes Son     Diabetes Maternal Aunt     Diabetes Maternal Uncle     Breast Cancer Niece 33    DVT/VTE Neg    [3]   Allergies  Allergen Reactions    Atorvastatin OTHER (SEE COMMENTS)     intolerance    Fatigue    Fatigue    intolerance    Fatigue    Pravastatin FATIGUE and OTHER (SEE COMMENTS)     fatigue    Clindamycin ANAPHYLAXIS and RASH    Pcn [Penicillin V] ANAPHYLAXIS    Azelastine OTHER (SEE COMMENTS) and RASH     hypersensitivity    hypersensitivity   hypersensitivity    Penicillins ITCHING, OTHER (SEE COMMENTS) and RASH     Unknown reaction 40 years ago    Other reaction(s): Itching   [4]    Probiotic Product (PROBIOTIC ADVANCED OR)       magnesium 250 MG Oral Tab Take 1  tablet (250 mg total) by mouth daily.      Magnesium Oxide -Mg Supplement ([employer] MAGNESIUM) 500 MG Oral Tab       Digestive Enzymes (ENZYME DIGEST) Oral Cap Take by mouth daily.      cyclobenzaprine 10 MG Oral Tab Take 1 tablet (10 mg total) by mouth nightly as needed.      Cyanocobalamin (VITAMIN B-12) 1000 MCG/15ML Oral Liquid       fluticasone furoate-vilanterol (BREO ELLIPTA) 200-25 MCG/ACT Inhalation Aerosol Powder, Breath Activated Inhale 1 puff into the lungs daily.      LORazepam 0.5 MG Oral Tab Take 1-2 tablets (0.5-1 mg total) by mouth daily as needed.      Vitamin E (VITAMIN E HIGH POTENCY) 180 MG (400 UNIT) Oral Cap       LORazepam 1 MG Oral Tab Take 1 tablet (1 mg total) by mouth as needed in the morning and 1 tablet (1 mg total) as needed in the evening.      amLODIPine 5 MG Oral Tab Take 1 tablet (5 mg total) by mouth daily. Take in addition to amlodipine-benazepril 5-20 mg daily 90 tablet 3    ezetimibe (ZETIA) 10 MG Oral Tab Take 1 tablet (10 mg total) by mouth daily. 90 tablet 3    amLODIPine Besy-Benazepril HCl 5-20 MG Oral Cap Take 1 capsule by mouth in the morning.      clopidogrel 75 MG Oral Tab Take 1 tablet (75 mg total) by mouth in the morning.      fluticasone furoate-vilanterol 200-25 MCG/ACT Inhalation Aerosol Powder, Breath Activated Inhale 1 puff into the lungs daily.      Albuterol Sulfate HFA (PROAIR HFA) 108 (90 BASE) MCG/ACT Inhalation Aero Soln Inhale 2 puffs into the lungs every 4 (four) hours as needed for Wheezing. 1 Inhaler 2    cetirizine (ZYRTEC) 10 MG Oral Tab TAKE ONE TABLET BY MOUTH NIGHTLY 90 tablet 0    Cholecalciferol (VITAMIN D) 1000 UNITS Oral Tab Take by mouth.      Omega-3 Fatty Acids (FISH OIL) 1200 MG Oral Cap Take by mouth.      Ascorbic Acid (VITAMIN C) 1000 MG Oral Tab Take 1 tablet (1,000 mg total) by mouth in the morning.

## 2025-06-06 ENCOUNTER — HOSPITAL ENCOUNTER (OUTPATIENT)
Dept: ULTRASOUND IMAGING | Facility: HOSPITAL | Age: 75
Discharge: HOME | End: 2025-06-06
Payer: MEDICARE

## 2025-06-06 DIAGNOSIS — I83.813 VARICOSE VEINS OF BILATERAL LOWER EXTREMITIES WITH PAIN: ICD-10-CM

## 2025-06-06 PROCEDURE — 93970 EXTREMITY STUDY: CPT

## 2025-06-09 ENCOUNTER — MED REC SCAN ONLY (OUTPATIENT)
Dept: INTERNAL MEDICINE CLINIC | Facility: CLINIC | Age: 75
End: 2025-06-09

## 2025-06-12 ENCOUNTER — HOSPITAL ENCOUNTER (OUTPATIENT)
Dept: ULTRASOUND IMAGING | Facility: HOSPITAL | Age: 75
Discharge: HOME | End: 2025-06-12
Attending: INTERNAL MEDICINE
Payer: MEDICARE

## 2025-06-12 DIAGNOSIS — R10.33 PERIUMBILICAL PAIN: ICD-10-CM

## 2025-06-12 PROCEDURE — 76705 ECHO EXAM OF ABDOMEN: CPT | Performed by: INTERNAL MEDICINE

## 2025-06-13 ENCOUNTER — TELEPHONE (OUTPATIENT)
Age: 75
End: 2025-06-13

## 2025-06-13 NOTE — TELEPHONE ENCOUNTER
I called Ms. Price and discussed that her MRI/MRCP imaging report from May, 2025 has an addendum that has been added following our multidisciplinary tumor board on Phoebe 10, 2025.  With the addition of patient's outside imaging report from 2023, our radiologist Dr. Mathis, has determined that she has bilateral adenomas which are benign features which do not require repeat imaging.  Patient was informed of these results.  She was also informed of recommendations for her to undergo a repeat MRI with MRCP, which can be ordered by her referring PCP Dr. Finch, in 1 year (5/2026) to main team surveillance of IPMN and centimeter peripancreatic lymph nodes, both of which have been noted to be unchanged since 2023. Patient thanked me for the call and information. She states that since we discussed these results via phone, that she does not feel it is necessary to keep the 4 week follow up visit with me. Pt thanked me for the call and information.    Rodolfo Caruso MD  [facility]  [facility]irtha Sharif [facility] [place]  [address]  [phone]

## 2025-07-03 ENCOUNTER — TELEPHONE (OUTPATIENT)
Age: 75
End: 2025-07-03

## 2025-07-03 NOTE — TELEPHONE ENCOUNTER
Called and spoke with Mickie. Told her per the last phone call she had with Dr. Caruso on 6/13/2025, since they discussed everything over the phone, she agreed to cancel her 4 week follow up visit. Told her the follow up is still scheduled for Monday, 7/07 at 2:20 pm and want to confirm with her that we are cancelling it. She verbalized understanding and agreed to cancel the appointment.

## 2025-07-07 ENCOUNTER — APPOINTMENT (OUTPATIENT)
Facility: LOCATION | Age: 75
End: 2025-07-07
Attending: INTERNAL MEDICINE

## 2025-08-04 PROBLEM — E78.2 MIXED HYPERLIPIDEMIA: Status: ACTIVE | Noted: 2017-08-22

## 2025-08-04 PROBLEM — R20.0 NUMBNESS AND TINGLING OF BOTH LEGS: Status: ACTIVE | Noted: 2018-06-27

## 2025-08-04 PROBLEM — E21.0 PRIMARY HYPERPARATHYROIDISM (HCC): Status: ACTIVE | Noted: 2025-08-04

## 2025-08-04 PROBLEM — S09.8XXA BLUNT HEAD TRAUMA: Status: ACTIVE | Noted: 2020-07-15

## 2025-08-04 PROBLEM — M25.532 LEFT WRIST PAIN: Status: ACTIVE | Noted: 2020-02-08

## 2025-08-04 PROBLEM — S49.90XA SHOULDER INJURY: Status: ACTIVE | Noted: 2019-01-16

## 2025-08-04 PROBLEM — I80.02: Status: ACTIVE | Noted: 2019-03-16

## 2025-08-04 PROBLEM — S16.1XXA CERVICAL STRAIN: Status: ACTIVE | Noted: 2019-05-14

## 2025-08-04 PROBLEM — M47.22 CERVICAL RADICULOPATHY DUE TO DEGENERATIVE JOINT DISEASE OF SPINE: Status: ACTIVE | Noted: 2017-05-09

## 2025-08-04 PROBLEM — K83.8 DILATED CBD, ACQUIRED: Status: ACTIVE | Noted: 2019-06-25

## 2025-08-04 PROBLEM — R20.2 NUMBNESS AND TINGLING OF BOTH LEGS: Status: ACTIVE | Noted: 2018-06-27

## 2025-08-04 PROBLEM — E78.5 DYSLIPIDEMIA: Status: ACTIVE | Noted: 2017-07-05
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
## 2025-08-04 PROBLEM — L81.9 DISCOLORATION OF SKIN OF MULTIPLE SITES OF LOWER EXTREMITY: Status: ACTIVE | Noted: 2017-01-03

## 2025-08-04 PROBLEM — E27.9 ADRENAL NODULE (HCC): Status: ACTIVE | Noted: 2019-04-26

## 2025-08-04 PROBLEM — J44.9 CHRONIC OBSTRUCTIVE PULMONARY DISEASE (HCC): Status: ACTIVE | Noted: 2019-03-16

## 2025-08-04 PROBLEM — I89.0 LYMPHEDEMA OF LOWER EXTREMITY: Status: ACTIVE | Noted: 2019-03-06

## 2025-08-04 PROBLEM — I47.10 PAROXYSMAL SUPRAVENTRICULAR TACHYCARDIA (HCC): Status: ACTIVE | Noted: 2019-03-16

## 2025-08-04 PROBLEM — K86.2 PANCREATIC CYST (HCC): Status: ACTIVE | Noted: 2019-04-26

## 2025-08-04 PROBLEM — R55 PRE-SYNCOPE: Status: ACTIVE | Noted: 2023-07-07

## 2025-08-04 PROBLEM — M75.82 ROTATOR CUFF TENDINITIS, LEFT: Status: ACTIVE | Noted: 2021-11-03

## 2025-08-04 PROBLEM — Z86.0100 HISTORY OF COLONIC POLYPS: Status: ACTIVE | Noted: 2022-07-11

## 2025-08-04 PROBLEM — M85.80 OSTEOPENIA: Status: ACTIVE | Noted: 2025-08-04

## 2025-08-04 PROBLEM — Q21.12 PFO (PATENT FORAMEN OVALE) (HCC): Status: ACTIVE | Noted: 2017-10-08

## 2025-08-06 ENCOUNTER — OFFICE VISIT (OUTPATIENT)
Dept: NEUROLOGY | Facility: CLINIC | Age: 75
End: 2025-08-06

## 2025-08-06 VITALS
RESPIRATION RATE: 16 BRPM | DIASTOLIC BLOOD PRESSURE: 72 MMHG | HEART RATE: 73 BPM | WEIGHT: 176 LBS | BODY MASS INDEX: 30 KG/M2 | SYSTOLIC BLOOD PRESSURE: 128 MMHG

## 2025-08-06 DIAGNOSIS — R42 LIGHTHEADEDNESS: ICD-10-CM

## 2025-08-06 DIAGNOSIS — Z86.73 HISTORY OF STROKE: Primary | ICD-10-CM

## 2025-08-06 PROCEDURE — 1159F MED LIST DOCD IN RCRD: CPT | Performed by: OTHER

## 2025-08-06 PROCEDURE — 3074F SYST BP LT 130 MM HG: CPT | Performed by: OTHER

## 2025-08-06 PROCEDURE — 99204 OFFICE O/P NEW MOD 45 MIN: CPT | Performed by: OTHER

## 2025-08-06 PROCEDURE — 3078F DIAST BP <80 MM HG: CPT | Performed by: OTHER

## 2025-08-25 ENCOUNTER — OFFICE VISIT (OUTPATIENT)
Dept: INTERNAL MEDICINE CLINIC | Facility: CLINIC | Age: 75
End: 2025-08-25

## 2025-08-25 VITALS
OXYGEN SATURATION: 98 % | SYSTOLIC BLOOD PRESSURE: 134 MMHG | DIASTOLIC BLOOD PRESSURE: 80 MMHG | HEART RATE: 79 BPM | BODY MASS INDEX: 30.56 KG/M2 | TEMPERATURE: 98 F | WEIGHT: 179 LBS | HEIGHT: 64 IN

## 2025-08-25 DIAGNOSIS — Z86.73 HISTORY OF CVA (CEREBROVASCULAR ACCIDENT): ICD-10-CM

## 2025-08-25 DIAGNOSIS — E78.5 DYSLIPIDEMIA: ICD-10-CM

## 2025-08-25 DIAGNOSIS — J43.1 PANLOBULAR EMPHYSEMA (HCC): ICD-10-CM

## 2025-08-25 DIAGNOSIS — I10 PRIMARY HYPERTENSION: ICD-10-CM

## 2025-08-25 DIAGNOSIS — D49.0 IPMN (INTRADUCTAL PAPILLARY MUCINOUS NEOPLASM): ICD-10-CM

## 2025-08-25 DIAGNOSIS — Z63.4 RECENT BEREAVEMENT: ICD-10-CM

## 2025-08-25 DIAGNOSIS — E27.9 ADRENAL NODULE (HCC): Primary | ICD-10-CM

## 2025-08-25 PROCEDURE — 99214 OFFICE O/P EST MOD 30 MIN: CPT | Performed by: INTERNAL MEDICINE

## 2025-08-25 PROCEDURE — 3075F SYST BP GE 130 - 139MM HG: CPT | Performed by: INTERNAL MEDICINE

## 2025-08-25 PROCEDURE — 1159F MED LIST DOCD IN RCRD: CPT | Performed by: INTERNAL MEDICINE

## 2025-08-25 PROCEDURE — 3079F DIAST BP 80-89 MM HG: CPT | Performed by: INTERNAL MEDICINE

## 2025-08-25 PROCEDURE — 3008F BODY MASS INDEX DOCD: CPT | Performed by: INTERNAL MEDICINE

## 2025-08-25 PROCEDURE — 1160F RVW MEDS BY RX/DR IN RCRD: CPT | Performed by: INTERNAL MEDICINE

## 2025-08-25 PROCEDURE — 1126F AMNT PAIN NOTED NONE PRSNT: CPT | Performed by: INTERNAL MEDICINE

## 2025-08-25 RX ORDER — EZETIMIBE 10 MG/1
10 TABLET ORAL NIGHTLY
Qty: 90 TABLET | Refills: 0 | Status: SHIPPED | OUTPATIENT
Start: 2025-08-25

## 2025-08-25 SDOH — SOCIAL STABILITY - SOCIAL INSECURITY: DISSAPEARANCE AND DEATH OF FAMILY MEMBER: Z63.4

## (undated) NOTE — ED AVS SNAPSHOT
Kathleen Gaines   MRN: [id number]    Department:  [facility] Emergency Department   Date of Visit:  5/14/2019           Disclosure     Insurance plans vary and the physician(s) referred by the ER may not be covered by your plan.  Please contact yo within the next three months to obtain basic health screening including reassessment of your blood pressure.     IF THERE IS ANY CHANGE OR WORSENING OF YOUR CONDITION, CALL YOUR PRIMARY CARE PHYSICIAN AT ONCE OR RETURN IMMEDIATELY TO THE EMERGENCY DEPARTMEN

## (undated) NOTE — LETTER
2/12/2025              Mickie Price        [address] [address]         To Whom It May Concern,    Mickie Price is under my care. She needed to be seen today in the clinic for further evaluation and medication management. She is safe to travel.     Sincerely,    Sara Finch MD          Document electronically generated by:  Sara Finch MD